# Patient Record
Sex: FEMALE | Race: WHITE | NOT HISPANIC OR LATINO
[De-identification: names, ages, dates, MRNs, and addresses within clinical notes are randomized per-mention and may not be internally consistent; named-entity substitution may affect disease eponyms.]

---

## 2017-06-21 ENCOUNTER — APPOINTMENT (OUTPATIENT)
Dept: FAMILY MEDICINE | Facility: CLINIC | Age: 47
End: 2017-06-21

## 2017-06-21 VITALS
HEIGHT: 61 IN | WEIGHT: 168 LBS | DIASTOLIC BLOOD PRESSURE: 70 MMHG | BODY MASS INDEX: 31.72 KG/M2 | SYSTOLIC BLOOD PRESSURE: 120 MMHG

## 2017-06-21 DIAGNOSIS — Z87.09 PERSONAL HISTORY OF OTHER DISEASES OF THE RESPIRATORY SYSTEM: ICD-10-CM

## 2017-06-21 DIAGNOSIS — R05 COUGH: ICD-10-CM

## 2017-06-21 DIAGNOSIS — J45.909 UNSPECIFIED ASTHMA, UNCOMPLICATED: ICD-10-CM

## 2017-06-21 DIAGNOSIS — Z86.69 PERSONAL HISTORY OF OTHER DISEASES OF THE NERVOUS SYSTEM AND SENSE ORGANS: ICD-10-CM

## 2017-06-21 RX ORDER — ANASTROZOLE TABLETS 1 MG/1
1 TABLET ORAL
Qty: 30 | Refills: 0 | Status: DISCONTINUED | COMMUNITY
Start: 2017-03-15

## 2017-06-21 RX ORDER — OXYCODONE AND ACETAMINOPHEN 5; 325 MG/1; MG/1
5-325 TABLET ORAL
Qty: 20 | Refills: 0 | Status: DISCONTINUED | COMMUNITY
Start: 2017-05-30

## 2017-06-21 RX ORDER — NALTREXONE HYDROCHLORIDE AND BUPROPION HYDROCHLORIDE 8; 90 MG/1; MG/1
8-90 TABLET, EXTENDED RELEASE ORAL
Qty: 120 | Refills: 0 | Status: DISCONTINUED | COMMUNITY
Start: 2016-12-07

## 2017-06-21 RX ORDER — CHLORHEXIDINE GLUCONATE, 0.12% ORAL RINSE 1.2 MG/ML
0.12 SOLUTION DENTAL
Qty: 473 | Refills: 0 | Status: DISCONTINUED | COMMUNITY
Start: 2017-05-30

## 2017-06-21 RX ORDER — DOXYCYCLINE 100 MG/1
100 CAPSULE ORAL
Qty: 14 | Refills: 0 | Status: DISCONTINUED | COMMUNITY
Start: 2017-06-11

## 2017-06-21 RX ORDER — AMOXICILLIN 500 MG/1
500 TABLET, FILM COATED ORAL
Qty: 21 | Refills: 0 | Status: DISCONTINUED | COMMUNITY
Start: 2017-05-30

## 2017-07-05 ENCOUNTER — MEDICATION RENEWAL (OUTPATIENT)
Age: 47
End: 2017-07-05

## 2017-07-18 ENCOUNTER — RX RENEWAL (OUTPATIENT)
Age: 47
End: 2017-07-18

## 2017-07-25 ENCOUNTER — APPOINTMENT (OUTPATIENT)
Dept: FAMILY MEDICINE | Facility: CLINIC | Age: 47
End: 2017-07-25

## 2017-07-25 VITALS
BODY MASS INDEX: 31.72 KG/M2 | SYSTOLIC BLOOD PRESSURE: 120 MMHG | HEIGHT: 61 IN | DIASTOLIC BLOOD PRESSURE: 80 MMHG | WEIGHT: 168 LBS

## 2017-07-25 DIAGNOSIS — Z85.3 PERSONAL HISTORY OF MALIGNANT NEOPLASM OF BREAST: ICD-10-CM

## 2017-07-26 LAB
25(OH)D3 SERPL-MCNC: 36.8 NG/ML
ALBUMIN SERPL ELPH-MCNC: 4.4 G/DL
ALP BLD-CCNC: 78 U/L
ALT SERPL-CCNC: 16 U/L
ANION GAP SERPL CALC-SCNC: 18 MMOL/L
APPEARANCE: ABNORMAL
AST SERPL-CCNC: 17 U/L
BACTERIA: ABNORMAL
BASOPHILS # BLD AUTO: 0.02 K/UL
BASOPHILS NFR BLD AUTO: 0.4 %
BILIRUB SERPL-MCNC: 0.3 MG/DL
BILIRUBIN URINE: NEGATIVE
BLOOD URINE: NEGATIVE
BUN SERPL-MCNC: 12 MG/DL
CALCIUM SERPL-MCNC: 9.1 MG/DL
CHLORIDE SERPL-SCNC: 103 MMOL/L
CHOLEST SERPL-MCNC: 162 MG/DL
CHOLEST/HDLC SERPL: 2.5 RATIO
CO2 SERPL-SCNC: 21 MMOL/L
COLOR: YELLOW
CREAT SERPL-MCNC: 0.81 MG/DL
EOSINOPHIL # BLD AUTO: 0.09 K/UL
EOSINOPHIL NFR BLD AUTO: 1.9 %
GLUCOSE QUALITATIVE U: NORMAL MG/DL
GLUCOSE SERPL-MCNC: 116 MG/DL
HBA1C MFR BLD HPLC: 5.6 %
HCT VFR BLD CALC: 33.7 %
HDLC SERPL-MCNC: 64 MG/DL
HGB BLD-MCNC: 10.5 G/DL
HYALINE CASTS: 12 /LPF
IMM GRANULOCYTES NFR BLD AUTO: 0 %
IRON SERPL-MCNC: 37 UG/DL
KETONES URINE: NEGATIVE
LDLC SERPL CALC-MCNC: 72 MG/DL
LEUKOCYTE ESTERASE URINE: NEGATIVE
LYMPHOCYTES # BLD AUTO: 1.46 K/UL
LYMPHOCYTES NFR BLD AUTO: 31 %
MAN DIFF?: NORMAL
MCHC RBC-ENTMCNC: 24.7 PG
MCHC RBC-ENTMCNC: 31.2 GM/DL
MCV RBC AUTO: 79.3 FL
MICROSCOPIC-UA: NORMAL
MONOCYTES # BLD AUTO: 0.22 K/UL
MONOCYTES NFR BLD AUTO: 4.7 %
NEUTROPHILS # BLD AUTO: 2.92 K/UL
NEUTROPHILS NFR BLD AUTO: 62 %
NITRITE URINE: POSITIVE
PH URINE: 6
PLATELET # BLD AUTO: 269 K/UL
POTASSIUM SERPL-SCNC: 4.3 MMOL/L
PROT SERPL-MCNC: 6.6 G/DL
PROTEIN URINE: NEGATIVE MG/DL
RBC # BLD: 4.25 M/UL
RBC # FLD: 16.5 %
RED BLOOD CELLS URINE: 3 /HPF
SODIUM SERPL-SCNC: 142 MMOL/L
SPECIFIC GRAVITY URINE: 1.02
SQUAMOUS EPITHELIAL CELLS: 7 /HPF
TRIGL SERPL-MCNC: 132 MG/DL
TSH SERPL-ACNC: 3.89 UIU/ML
UROBILINOGEN URINE: NORMAL MG/DL
WBC # FLD AUTO: 4.71 K/UL
WHITE BLOOD CELLS URINE: 3 /HPF

## 2017-07-31 ENCOUNTER — RX RENEWAL (OUTPATIENT)
Age: 47
End: 2017-07-31

## 2017-08-16 ENCOUNTER — MEDICATION RENEWAL (OUTPATIENT)
Age: 47
End: 2017-08-16

## 2017-09-05 ENCOUNTER — MEDICATION RENEWAL (OUTPATIENT)
Age: 47
End: 2017-09-05

## 2017-12-04 ENCOUNTER — MEDICATION RENEWAL (OUTPATIENT)
Age: 47
End: 2017-12-04

## 2017-12-28 ENCOUNTER — APPOINTMENT (OUTPATIENT)
Dept: FAMILY MEDICINE | Facility: CLINIC | Age: 47
End: 2017-12-28
Payer: COMMERCIAL

## 2017-12-28 VITALS
SYSTOLIC BLOOD PRESSURE: 110 MMHG | BODY MASS INDEX: 32.85 KG/M2 | HEIGHT: 61 IN | DIASTOLIC BLOOD PRESSURE: 80 MMHG | WEIGHT: 174 LBS

## 2017-12-28 LAB — CYTOLOGY CVX/VAG DOC THIN PREP: NORMAL

## 2017-12-28 PROCEDURE — 99213 OFFICE O/P EST LOW 20 MIN: CPT

## 2017-12-28 RX ORDER — ETODOLAC 400 MG/1
400 TABLET, FILM COATED ORAL TWICE DAILY
Qty: 28 | Refills: 0 | Status: DISCONTINUED | COMMUNITY
Start: 2017-06-21 | End: 2017-12-28

## 2017-12-28 RX ORDER — PRAMIPEXOLE DIHYDROCHLORIDE 0.5 MG/1
0.5 TABLET ORAL
Qty: 30 | Refills: 1 | Status: DISCONTINUED | COMMUNITY
Start: 2017-07-05 | End: 2017-12-28

## 2017-12-28 RX ORDER — BACLOFEN 10 MG/1
10 TABLET ORAL
Qty: 30 | Refills: 0 | Status: DISCONTINUED | COMMUNITY
Start: 2017-06-21 | End: 2017-12-28

## 2017-12-28 RX ORDER — HYDROCODONE BITARTRATE AND ACETAMINOPHEN 7.5; 3 MG/1; MG/1
7.5-3 TABLET ORAL
Qty: 12 | Refills: 0 | Status: COMPLETED | COMMUNITY
Start: 2017-08-23

## 2017-12-28 RX ORDER — PRAMIPEXOLE DIHYDROCHLORIDE 0.12 MG/1
0.12 TABLET ORAL
Qty: 50 | Refills: 0 | Status: COMPLETED | COMMUNITY
Start: 2017-08-19

## 2018-04-09 ENCOUNTER — APPOINTMENT (OUTPATIENT)
Dept: FAMILY MEDICINE | Facility: CLINIC | Age: 48
End: 2018-04-09
Payer: COMMERCIAL

## 2018-04-09 VITALS
BODY MASS INDEX: 33.04 KG/M2 | TEMPERATURE: 97.8 F | WEIGHT: 175 LBS | SYSTOLIC BLOOD PRESSURE: 110 MMHG | HEIGHT: 61 IN | DIASTOLIC BLOOD PRESSURE: 80 MMHG

## 2018-04-09 PROCEDURE — 36415 COLL VENOUS BLD VENIPUNCTURE: CPT

## 2018-04-09 PROCEDURE — 99213 OFFICE O/P EST LOW 20 MIN: CPT | Mod: 25

## 2018-05-28 ENCOUNTER — RX RENEWAL (OUTPATIENT)
Age: 48
End: 2018-05-28

## 2018-06-05 ENCOUNTER — RX RENEWAL (OUTPATIENT)
Age: 48
End: 2018-06-05

## 2018-08-01 ENCOUNTER — APPOINTMENT (OUTPATIENT)
Dept: FAMILY MEDICINE | Facility: CLINIC | Age: 48
End: 2018-08-01
Payer: COMMERCIAL

## 2018-08-01 VITALS
RESPIRATION RATE: 16 BRPM | OXYGEN SATURATION: 97 % | DIASTOLIC BLOOD PRESSURE: 64 MMHG | WEIGHT: 178 LBS | BODY MASS INDEX: 33.61 KG/M2 | HEIGHT: 61 IN | HEART RATE: 76 BPM | SYSTOLIC BLOOD PRESSURE: 102 MMHG

## 2018-08-01 PROCEDURE — 36415 COLL VENOUS BLD VENIPUNCTURE: CPT

## 2018-08-01 PROCEDURE — 99214 OFFICE O/P EST MOD 30 MIN: CPT | Mod: 25

## 2018-08-01 RX ORDER — AMOXICILLIN AND CLAVULANATE POTASSIUM 875; 125 MG/1; MG/1
875-125 TABLET, COATED ORAL
Qty: 14 | Refills: 0 | Status: DISCONTINUED | COMMUNITY
Start: 2018-04-09 | End: 2018-08-01

## 2018-08-01 RX ORDER — SODIUM SULFATE, POTASSIUM SULFATE, MAGNESIUM SULFATE 17.5; 3.13; 1.6 G/ML; G/ML; G/ML
17.5-3.13-1.6 SOLUTION, CONCENTRATE ORAL
Qty: 354 | Refills: 0 | Status: DISCONTINUED | COMMUNITY
Start: 2018-01-26 | End: 2018-08-01

## 2018-08-01 RX ORDER — PREDNISONE 20 MG/1
20 TABLET ORAL
Qty: 12 | Refills: 0 | Status: DISCONTINUED | COMMUNITY
Start: 2018-04-09 | End: 2018-08-01

## 2018-08-01 RX ORDER — HYDROCODONE BITARTRATE AND HOMATROPINE METHYLBROMIDE 5; 1.5 MG/5ML; MG/5ML
5-1.5 SOLUTION ORAL
Qty: 120 | Refills: 0 | Status: DISCONTINUED | COMMUNITY
Start: 2017-12-28 | End: 2018-08-01

## 2018-08-01 NOTE — PHYSICAL EXAM
[Well Nourished] : well nourished [Clear to Auscultation] : lungs were clear to auscultation bilaterally [Regular Rhythm] : with a regular rhythm [de-identified] : tenderness to costal cartilage on right

## 2018-08-01 NOTE — HEALTH RISK ASSESSMENT
[] : No [No falls in past year] : Patient reported no falls in the past year [0] : 2) Feeling down, depressed, or hopeless: Not at all (0) [[id number]] : 0

## 2018-08-01 NOTE — HISTORY OF PRESENT ILLNESS
[de-identified] : 48 year old female here with complaints of left rib pain after a history of breaking three of her ribs. Patient feels she has not healed right. Patient also takes contrave for restless leg which is working well. Patients active medications, allergies and issues were all reviewed with the patient at time of visit.\par

## 2018-08-02 LAB
ALBUMIN SERPL ELPH-MCNC: 4.6 G/DL
ALP BLD-CCNC: 100 U/L
ALT SERPL-CCNC: 39 U/L
ANION GAP SERPL CALC-SCNC: 13 MMOL/L
AST SERPL-CCNC: 29 U/L
BASOPHILS # BLD AUTO: 0.02 K/UL
BASOPHILS NFR BLD AUTO: 0.4 %
BILIRUB SERPL-MCNC: 0.2 MG/DL
BUN SERPL-MCNC: 10 MG/DL
CALCIUM SERPL-MCNC: 9.5 MG/DL
CHLORIDE SERPL-SCNC: 103 MMOL/L
CHOLEST SERPL-MCNC: 181 MG/DL
CHOLEST/HDLC SERPL: 3.2 RATIO
CO2 SERPL-SCNC: 26 MMOL/L
CREAT SERPL-MCNC: 0.85 MG/DL
EOSINOPHIL # BLD AUTO: 0.13 K/UL
EOSINOPHIL NFR BLD AUTO: 2.7 %
FOLATE SERPL-MCNC: 14.6 NG/ML
GLUCOSE SERPL-MCNC: 107 MG/DL
HBA1C MFR BLD HPLC: 5.9 %
HCT VFR BLD CALC: 37.4 %
HDLC SERPL-MCNC: 56 MG/DL
HGB BLD-MCNC: 11.3 G/DL
IMM GRANULOCYTES NFR BLD AUTO: 0.4 %
LDLC SERPL CALC-MCNC: 95 MG/DL
LYMPHOCYTES # BLD AUTO: 1.38 K/UL
LYMPHOCYTES NFR BLD AUTO: 29 %
MAN DIFF?: NORMAL
MCHC RBC-ENTMCNC: 24 PG
MCHC RBC-ENTMCNC: 30.2 GM/DL
MCV RBC AUTO: 79.4 FL
MONOCYTES # BLD AUTO: 0.3 K/UL
MONOCYTES NFR BLD AUTO: 6.3 %
NEUTROPHILS # BLD AUTO: 2.91 K/UL
NEUTROPHILS NFR BLD AUTO: 61.2 %
PLATELET # BLD AUTO: 260 K/UL
POTASSIUM SERPL-SCNC: 4.7 MMOL/L
PROT SERPL-MCNC: 7.1 G/DL
RBC # BLD: 4.71 M/UL
RBC # FLD: 16.2 %
SODIUM SERPL-SCNC: 142 MMOL/L
TRIGL SERPL-MCNC: 149 MG/DL
TSH SERPL-ACNC: 3.32 UIU/ML
VIT B12 SERPL-MCNC: 873 PG/ML
WBC # FLD AUTO: 4.76 K/UL

## 2018-09-26 ENCOUNTER — MEDICATION RENEWAL (OUTPATIENT)
Age: 48
End: 2018-09-26

## 2018-10-16 ENCOUNTER — MEDICATION RENEWAL (OUTPATIENT)
Age: 48
End: 2018-10-16

## 2018-10-23 ENCOUNTER — MEDICATION RENEWAL (OUTPATIENT)
Age: 48
End: 2018-10-23

## 2019-02-14 ENCOUNTER — APPOINTMENT (OUTPATIENT)
Dept: FAMILY MEDICINE | Facility: CLINIC | Age: 49
End: 2019-02-14
Payer: COMMERCIAL

## 2019-02-14 VITALS
HEART RATE: 76 BPM | BODY MASS INDEX: 32.2 KG/M2 | TEMPERATURE: 98.5 F | HEIGHT: 62 IN | DIASTOLIC BLOOD PRESSURE: 80 MMHG | WEIGHT: 175 LBS | SYSTOLIC BLOOD PRESSURE: 100 MMHG

## 2019-02-14 DIAGNOSIS — M94.0 CHONDROCOSTAL JUNCTION SYNDROME [TIETZE]: ICD-10-CM

## 2019-02-14 DIAGNOSIS — R07.81 PLEURODYNIA: ICD-10-CM

## 2019-02-14 DIAGNOSIS — G25.81 RESTLESS LEGS SYNDROME: ICD-10-CM

## 2019-02-14 DIAGNOSIS — Z87.39 PERSONAL HISTORY OF OTHER DISEASES OF THE MUSCULOSKELETAL SYSTEM AND CONNECTIVE TISSUE: ICD-10-CM

## 2019-02-14 DIAGNOSIS — J30.9 ALLERGIC RHINITIS, UNSPECIFIED: ICD-10-CM

## 2019-02-14 PROCEDURE — 99214 OFFICE O/P EST MOD 30 MIN: CPT

## 2019-02-14 NOTE — PHYSICAL EXAM
[No Acute Distress] : no acute distress [Well Nourished] : well nourished [Normal Sclera/Conjunctiva] : normal sclera/conjunctiva [EOMI] : extraocular movements intact [Normal Outer Ear/Nose] : the outer ears and nose were normal in appearance [No JVD] : no jugular venous distention [No Respiratory Distress] : no respiratory distress  [Clear to Auscultation] : lungs were clear to auscultation bilaterally [No Accessory Muscle Use] : no accessory muscle use [Normal Rate] : normal rate  [Regular Rhythm] : with a regular rhythm [Normal Gait] : normal gait [Normal Affect] : the affect was normal [Alert and Oriented x3] : oriented to person, place, and time [Normal Insight/Judgement] : insight and judgment were intact

## 2019-03-08 ENCOUNTER — RX RENEWAL (OUTPATIENT)
Age: 49
End: 2019-03-08

## 2019-04-03 ENCOUNTER — RX RENEWAL (OUTPATIENT)
Age: 49
End: 2019-04-03

## 2020-11-19 ENCOUNTER — APPOINTMENT (OUTPATIENT)
Dept: FAMILY MEDICINE | Facility: CLINIC | Age: 50
End: 2020-11-19
Payer: COMMERCIAL

## 2020-11-19 VITALS
DIASTOLIC BLOOD PRESSURE: 80 MMHG | WEIGHT: 183 LBS | SYSTOLIC BLOOD PRESSURE: 114 MMHG | OXYGEN SATURATION: 97 % | HEART RATE: 78 BPM | BODY MASS INDEX: 33.68 KG/M2 | RESPIRATION RATE: 16 BRPM | HEIGHT: 62 IN

## 2020-11-19 DIAGNOSIS — Z11.59 ENCOUNTER FOR SCREENING FOR OTHER VIRAL DISEASES: ICD-10-CM

## 2020-11-19 DIAGNOSIS — Z00.00 ENCOUNTER FOR GENERAL ADULT MEDICAL EXAMINATION W/OUT ABNORMAL FINDINGS: ICD-10-CM

## 2020-11-19 DIAGNOSIS — K76.0 FATTY (CHANGE OF) LIVER, NOT ELSEWHERE CLASSIFIED: ICD-10-CM

## 2020-11-19 LAB
25(OH)D3 SERPL-MCNC: 34.5 NG/ML
ALBUMIN SERPL ELPH-MCNC: 4.6 G/DL
ALP BLD-CCNC: 90 U/L
ALT SERPL-CCNC: 59 U/L
ANION GAP SERPL CALC-SCNC: 12 MMOL/L
APPEARANCE: CLEAR
AST SERPL-CCNC: 43 U/L
BACTERIA: NEGATIVE
BASOPHILS # BLD AUTO: 0.03 K/UL
BASOPHILS NFR BLD AUTO: 0.6 %
BILIRUB SERPL-MCNC: 0.3 MG/DL
BILIRUBIN URINE: NEGATIVE
BLOOD URINE: NEGATIVE
BUN SERPL-MCNC: 9 MG/DL
CALCIUM SERPL-MCNC: 9.7 MG/DL
CHLORIDE SERPL-SCNC: 103 MMOL/L
CHOLEST SERPL-MCNC: 175 MG/DL
CO2 SERPL-SCNC: 23 MMOL/L
COLOR: YELLOW
CREAT SERPL-MCNC: 0.74 MG/DL
EOSINOPHIL # BLD AUTO: 0.11 K/UL
EOSINOPHIL NFR BLD AUTO: 2 %
ESTIMATED AVERAGE GLUCOSE: 134 MG/DL
GLUCOSE QUALITATIVE U: NEGATIVE
GLUCOSE SERPL-MCNC: 107 MG/DL
HBA1C MFR BLD HPLC: 6.3 %
HCT VFR BLD CALC: 36.3 %
HDLC SERPL-MCNC: 53 MG/DL
HGB BLD-MCNC: 11.3 G/DL
HYALINE CASTS: 0 /LPF
IMM GRANULOCYTES NFR BLD AUTO: 0.2 %
KETONES URINE: NEGATIVE
LDLC SERPL CALC-MCNC: 94 MG/DL
LEUKOCYTE ESTERASE URINE: NEGATIVE
LYMPHOCYTES # BLD AUTO: 1.73 K/UL
LYMPHOCYTES NFR BLD AUTO: 32 %
MAN DIFF?: NORMAL
MCHC RBC-ENTMCNC: 25 PG
MCHC RBC-ENTMCNC: 31.1 GM/DL
MCV RBC AUTO: 80.3 FL
MICROSCOPIC-UA: NORMAL
MONOCYTES # BLD AUTO: 0.34 K/UL
MONOCYTES NFR BLD AUTO: 6.3 %
NEUTROPHILS # BLD AUTO: 3.19 K/UL
NEUTROPHILS NFR BLD AUTO: 58.9 %
NITRITE URINE: NEGATIVE
NONHDLC SERPL-MCNC: 122 MG/DL
PH URINE: 6.5
PLATELET # BLD AUTO: 264 K/UL
POTASSIUM SERPL-SCNC: 4.3 MMOL/L
PROT SERPL-MCNC: 7.2 G/DL
PROTEIN URINE: NEGATIVE
RBC # BLD: 4.52 M/UL
RBC # FLD: 15.9 %
RED BLOOD CELLS URINE: 1 /HPF
SODIUM SERPL-SCNC: 138 MMOL/L
SPECIFIC GRAVITY URINE: 1.02
SQUAMOUS EPITHELIAL CELLS: 4 /HPF
TRIGL SERPL-MCNC: 139 MG/DL
TSH SERPL-ACNC: 2.64 UIU/ML
UROBILINOGEN URINE: NORMAL
WBC # FLD AUTO: 5.41 K/UL
WHITE BLOOD CELLS URINE: 7 /HPF

## 2020-11-19 PROCEDURE — 99396 PREV VISIT EST AGE 40-64: CPT | Mod: 25

## 2020-11-19 PROCEDURE — 36415 COLL VENOUS BLD VENIPUNCTURE: CPT

## 2020-11-19 RX ORDER — NALTREXONE HYDROCHLORIDE AND BUPROPION HYDROCHLORIDE 8; 90 MG/1; MG/1
8-90 TABLET, EXTENDED RELEASE ORAL
Qty: 100 | Refills: 0 | Status: DISCONTINUED | COMMUNITY
Start: 2017-10-19 | End: 2020-11-19

## 2020-11-19 RX ORDER — FLUTICASONE PROPIONATE 110 UG/1
110 AEROSOL, METERED RESPIRATORY (INHALATION) TWICE DAILY
Qty: 1 | Refills: 0 | Status: DISCONTINUED | COMMUNITY
Start: 2017-12-28 | End: 2020-11-19

## 2020-11-19 RX ORDER — PREDNISONE 20 MG/1
20 TABLET ORAL
Qty: 10 | Refills: 0 | Status: DISCONTINUED | COMMUNITY
Start: 2019-02-14 | End: 2020-11-19

## 2020-11-19 RX ORDER — NAPROXEN 500 MG/1
500 TABLET ORAL
Qty: 20 | Refills: 0 | Status: DISCONTINUED | COMMUNITY
Start: 2018-08-01 | End: 2020-11-19

## 2020-11-19 RX ORDER — ALBUTEROL SULFATE 90 UG/1
108 (90 BASE) INHALANT RESPIRATORY (INHALATION)
Qty: 1 | Refills: 0 | Status: DISCONTINUED | COMMUNITY
Start: 2019-03-08 | End: 2020-11-19

## 2020-11-19 RX ORDER — PROMETHAZINE HYDROCHLORIDE AND CODEINE PHOSPHATE 6.25; 1 MG/5ML; MG/5ML
6.25-1 SOLUTION ORAL
Qty: 1 | Refills: 0 | Status: DISCONTINUED | COMMUNITY
Start: 2019-02-14 | End: 2020-11-19

## 2020-11-19 RX ORDER — ALBUTEROL SULFATE 90 UG/1
108 (90 BASE) AEROSOL, METERED RESPIRATORY (INHALATION)
Qty: 1 | Refills: 0 | Status: DISCONTINUED | COMMUNITY
Start: 2019-02-14 | End: 2020-11-19

## 2020-11-20 LAB
SARS-COV-2 IGG SERPL IA-ACNC: <0.1 INDEX
SARS-COV-2 IGG SERPL QL IA: NEGATIVE

## 2021-08-02 ENCOUNTER — APPOINTMENT (OUTPATIENT)
Dept: FAMILY MEDICINE | Facility: CLINIC | Age: 51
End: 2021-08-02
Payer: COMMERCIAL

## 2021-08-02 VITALS
TEMPERATURE: 98 F | SYSTOLIC BLOOD PRESSURE: 120 MMHG | WEIGHT: 183 LBS | HEIGHT: 62 IN | HEART RATE: 77 BPM | DIASTOLIC BLOOD PRESSURE: 70 MMHG | BODY MASS INDEX: 33.68 KG/M2 | OXYGEN SATURATION: 98 %

## 2021-08-02 DIAGNOSIS — K21.9 GASTRO-ESOPHAGEAL REFLUX DISEASE W/OUT ESOPHAGITIS: ICD-10-CM

## 2021-08-02 PROCEDURE — 99214 OFFICE O/P EST MOD 30 MIN: CPT

## 2021-08-02 RX ORDER — HYDROXYZINE PAMOATE 25 MG/1
25 CAPSULE ORAL
Qty: 30 | Refills: 0 | Status: DISCONTINUED | COMMUNITY
Start: 2020-11-29 | End: 2021-08-02

## 2021-08-02 RX ORDER — CETIRIZINE HYDROCHLORIDE 10 MG/1
10 TABLET, COATED ORAL
Qty: 30 | Refills: 0 | Status: DISCONTINUED | COMMUNITY
Start: 2021-05-27

## 2021-08-02 RX ORDER — TRAZODONE HYDROCHLORIDE 50 MG/1
50 TABLET ORAL
Qty: 30 | Refills: 0 | Status: DISCONTINUED | COMMUNITY
Start: 2020-11-19 | End: 2021-08-02

## 2021-11-09 ENCOUNTER — INPATIENT (INPATIENT)
Facility: HOSPITAL | Age: 51
LOS: 3 days | Discharge: HOME | DRG: 857 | End: 2021-11-13
Attending: PLASTIC SURGERY | Admitting: PLASTIC SURGERY
Payer: COMMERCIAL

## 2021-11-09 VITALS
SYSTOLIC BLOOD PRESSURE: 112 MMHG | RESPIRATION RATE: 18 BRPM | DIASTOLIC BLOOD PRESSURE: 76 MMHG | OXYGEN SATURATION: 100 % | TEMPERATURE: 98 F | HEART RATE: 127 BPM

## 2021-11-09 DIAGNOSIS — N61.1 ABSCESS OF THE BREAST AND NIPPLE: ICD-10-CM

## 2021-11-09 DIAGNOSIS — Z98.89 OTHER SPECIFIED POSTPROCEDURAL STATES: Chronic | ICD-10-CM

## 2021-11-09 DIAGNOSIS — N61.0 MASTITIS WITHOUT ABSCESS: ICD-10-CM

## 2021-11-09 LAB
ALBUMIN SERPL ELPH-MCNC: 3.8 G/DL — SIGNIFICANT CHANGE UP (ref 3.3–5)
ALP SERPL-CCNC: 89 U/L — SIGNIFICANT CHANGE UP (ref 40–120)
ALT FLD-CCNC: 34 U/L — SIGNIFICANT CHANGE UP (ref 12–78)
ANION GAP SERPL CALC-SCNC: 10 MMOL/L — SIGNIFICANT CHANGE UP (ref 5–17)
ANION GAP SERPL CALC-SCNC: 8 MMOL/L — SIGNIFICANT CHANGE UP (ref 5–17)
APPEARANCE UR: CLEAR — SIGNIFICANT CHANGE UP
APTT BLD: 29.3 SEC — SIGNIFICANT CHANGE UP (ref 27.5–35.5)
AST SERPL-CCNC: 17 U/L — SIGNIFICANT CHANGE UP (ref 15–37)
BASOPHILS # BLD AUTO: 0.05 K/UL — SIGNIFICANT CHANGE UP (ref 0–0.2)
BASOPHILS NFR BLD AUTO: 0.3 % — SIGNIFICANT CHANGE UP (ref 0–2)
BILIRUB SERPL-MCNC: 0.6 MG/DL — SIGNIFICANT CHANGE UP (ref 0.2–1.2)
BILIRUB UR-MCNC: NEGATIVE — SIGNIFICANT CHANGE UP
BLD GP AB SCN SERPL QL: SIGNIFICANT CHANGE UP
BUN SERPL-MCNC: 7 MG/DL — SIGNIFICANT CHANGE UP (ref 7–23)
BUN SERPL-MCNC: 9 MG/DL — SIGNIFICANT CHANGE UP (ref 7–23)
CALCIUM SERPL-MCNC: 8.1 MG/DL — LOW (ref 8.5–10.1)
CALCIUM SERPL-MCNC: 9.1 MG/DL — SIGNIFICANT CHANGE UP (ref 8.5–10.1)
CHLORIDE SERPL-SCNC: 105 MMOL/L — SIGNIFICANT CHANGE UP (ref 96–108)
CHLORIDE SERPL-SCNC: 111 MMOL/L — HIGH (ref 96–108)
CO2 SERPL-SCNC: 19 MMOL/L — LOW (ref 22–31)
CO2 SERPL-SCNC: 21 MMOL/L — LOW (ref 22–31)
COLOR SPEC: YELLOW — SIGNIFICANT CHANGE UP
CREAT SERPL-MCNC: 0.78 MG/DL — SIGNIFICANT CHANGE UP (ref 0.5–1.3)
CREAT SERPL-MCNC: 0.95 MG/DL — SIGNIFICANT CHANGE UP (ref 0.5–1.3)
DIFF PNL FLD: NEGATIVE — SIGNIFICANT CHANGE UP
EOSINOPHIL # BLD AUTO: 0.05 K/UL — SIGNIFICANT CHANGE UP (ref 0–0.5)
EOSINOPHIL NFR BLD AUTO: 0.3 % — SIGNIFICANT CHANGE UP (ref 0–6)
GLUCOSE SERPL-MCNC: 154 MG/DL — HIGH (ref 70–99)
GLUCOSE SERPL-MCNC: 173 MG/DL — HIGH (ref 70–99)
GLUCOSE UR QL: NEGATIVE MG/DL — SIGNIFICANT CHANGE UP
HCT VFR BLD CALC: 24.9 % — LOW (ref 34.5–45)
HCT VFR BLD CALC: 26.7 % — LOW (ref 34.5–45)
HGB BLD-MCNC: 7.7 G/DL — LOW (ref 11.5–15.5)
HGB BLD-MCNC: 8.5 G/DL — LOW (ref 11.5–15.5)
IMM GRANULOCYTES NFR BLD AUTO: 0.6 % — SIGNIFICANT CHANGE UP (ref 0–1.5)
INR BLD: 1.17 RATIO — HIGH (ref 0.88–1.16)
KETONES UR-MCNC: NEGATIVE — SIGNIFICANT CHANGE UP
LACTATE SERPL-SCNC: 2.5 MMOL/L — HIGH (ref 0.7–2)
LACTATE SERPL-SCNC: 3.2 MMOL/L — HIGH (ref 0.7–2)
LEUKOCYTE ESTERASE UR-ACNC: ABNORMAL
LYMPHOCYTES # BLD AUTO: 1.29 K/UL — SIGNIFICANT CHANGE UP (ref 1–3.3)
LYMPHOCYTES # BLD AUTO: 7.5 % — LOW (ref 13–44)
MCHC RBC-ENTMCNC: 26.2 PG — LOW (ref 27–34)
MCHC RBC-ENTMCNC: 26.8 PG — LOW (ref 27–34)
MCHC RBC-ENTMCNC: 30.9 GM/DL — LOW (ref 32–36)
MCHC RBC-ENTMCNC: 31.8 GM/DL — LOW (ref 32–36)
MCV RBC AUTO: 84.2 FL — SIGNIFICANT CHANGE UP (ref 80–100)
MCV RBC AUTO: 84.7 FL — SIGNIFICANT CHANGE UP (ref 80–100)
MONOCYTES # BLD AUTO: 0.82 K/UL — SIGNIFICANT CHANGE UP (ref 0–0.9)
MONOCYTES NFR BLD AUTO: 4.8 % — SIGNIFICANT CHANGE UP (ref 2–14)
NEUTROPHILS # BLD AUTO: 14.87 K/UL — HIGH (ref 1.8–7.4)
NEUTROPHILS NFR BLD AUTO: 86.5 % — HIGH (ref 43–77)
NITRITE UR-MCNC: NEGATIVE — SIGNIFICANT CHANGE UP
PH UR: 6.5 — SIGNIFICANT CHANGE UP (ref 5–8)
PLATELET # BLD AUTO: 290 K/UL — SIGNIFICANT CHANGE UP (ref 150–400)
PLATELET # BLD AUTO: 339 K/UL — SIGNIFICANT CHANGE UP (ref 150–400)
POTASSIUM SERPL-MCNC: 3.4 MMOL/L — LOW (ref 3.5–5.3)
POTASSIUM SERPL-MCNC: 3.5 MMOL/L — SIGNIFICANT CHANGE UP (ref 3.5–5.3)
POTASSIUM SERPL-SCNC: 3.4 MMOL/L — LOW (ref 3.5–5.3)
POTASSIUM SERPL-SCNC: 3.5 MMOL/L — SIGNIFICANT CHANGE UP (ref 3.5–5.3)
PROT SERPL-MCNC: 7.4 GM/DL — SIGNIFICANT CHANGE UP (ref 6–8.3)
PROT UR-MCNC: NEGATIVE MG/DL — SIGNIFICANT CHANGE UP
PROTHROM AB SERPL-ACNC: 13.5 SEC — SIGNIFICANT CHANGE UP (ref 10.6–13.6)
RBC # BLD: 2.94 M/UL — LOW (ref 3.8–5.2)
RBC # BLD: 3.17 M/UL — LOW (ref 3.8–5.2)
RBC # FLD: 17.2 % — HIGH (ref 10.3–14.5)
RBC # FLD: 17.5 % — HIGH (ref 10.3–14.5)
SARS-COV-2 RNA SPEC QL NAA+PROBE: SIGNIFICANT CHANGE UP
SODIUM SERPL-SCNC: 136 MMOL/L — SIGNIFICANT CHANGE UP (ref 135–145)
SODIUM SERPL-SCNC: 138 MMOL/L — SIGNIFICANT CHANGE UP (ref 135–145)
SP GR SPEC: 1.01 — SIGNIFICANT CHANGE UP (ref 1.01–1.02)
UROBILINOGEN FLD QL: NEGATIVE MG/DL — SIGNIFICANT CHANGE UP
WBC # BLD: 13.34 K/UL — HIGH (ref 3.8–10.5)
WBC # BLD: 17.19 K/UL — HIGH (ref 3.8–10.5)
WBC # FLD AUTO: 13.34 K/UL — HIGH (ref 3.8–10.5)
WBC # FLD AUTO: 17.19 K/UL — HIGH (ref 3.8–10.5)

## 2021-11-09 PROCEDURE — 87075 CULTR BACTERIA EXCEPT BLOOD: CPT

## 2021-11-09 PROCEDURE — 36415 COLL VENOUS BLD VENIPUNCTURE: CPT

## 2021-11-09 PROCEDURE — 86900 BLOOD TYPING SEROLOGIC ABO: CPT

## 2021-11-09 PROCEDURE — 83605 ASSAY OF LACTIC ACID: CPT

## 2021-11-09 PROCEDURE — 85027 COMPLETE CBC AUTOMATED: CPT

## 2021-11-09 PROCEDURE — 86850 RBC ANTIBODY SCREEN: CPT

## 2021-11-09 PROCEDURE — 87102 FUNGUS ISOLATION CULTURE: CPT

## 2021-11-09 PROCEDURE — 80202 ASSAY OF VANCOMYCIN: CPT

## 2021-11-09 PROCEDURE — 71045 X-RAY EXAM CHEST 1 VIEW: CPT | Mod: 26

## 2021-11-09 PROCEDURE — 87070 CULTURE OTHR SPECIMN AEROBIC: CPT

## 2021-11-09 PROCEDURE — 87186 SC STD MICRODIL/AGAR DIL: CPT

## 2021-11-09 PROCEDURE — 93010 ELECTROCARDIOGRAM REPORT: CPT

## 2021-11-09 PROCEDURE — 80048 BASIC METABOLIC PNL TOTAL CA: CPT

## 2021-11-09 PROCEDURE — 86901 BLOOD TYPING SEROLOGIC RH(D): CPT

## 2021-11-09 PROCEDURE — 99285 EMERGENCY DEPT VISIT HI MDM: CPT

## 2021-11-09 PROCEDURE — C9399: CPT

## 2021-11-09 PROCEDURE — 99221 1ST HOSP IP/OBS SF/LOW 40: CPT

## 2021-11-09 PROCEDURE — 86769 SARS-COV-2 COVID-19 ANTIBODY: CPT

## 2021-11-09 PROCEDURE — 87077 CULTURE AEROBIC IDENTIFY: CPT

## 2021-11-09 RX ORDER — PIPERACILLIN AND TAZOBACTAM 4; .5 G/20ML; G/20ML
3.38 INJECTION, POWDER, LYOPHILIZED, FOR SOLUTION INTRAVENOUS ONCE
Refills: 0 | Status: COMPLETED | OUTPATIENT
Start: 2021-11-09 | End: 2021-11-09

## 2021-11-09 RX ORDER — ONDANSETRON 8 MG/1
4 TABLET, FILM COATED ORAL EVERY 6 HOURS
Refills: 0 | Status: DISCONTINUED | OUTPATIENT
Start: 2021-11-09 | End: 2021-11-13

## 2021-11-09 RX ORDER — SODIUM CHLORIDE 9 MG/ML
2200 INJECTION INTRAMUSCULAR; INTRAVENOUS; SUBCUTANEOUS ONCE
Refills: 0 | Status: COMPLETED | OUTPATIENT
Start: 2021-11-09 | End: 2021-11-09

## 2021-11-09 RX ORDER — ENOXAPARIN SODIUM 100 MG/ML
40 INJECTION SUBCUTANEOUS DAILY
Refills: 0 | Status: DISCONTINUED | OUTPATIENT
Start: 2021-11-09 | End: 2021-11-13

## 2021-11-09 RX ORDER — TAMOXIFEN CITRATE 20 MG/1
20 TABLET, FILM COATED ORAL DAILY
Refills: 0 | Status: DISCONTINUED | OUTPATIENT
Start: 2021-11-09 | End: 2021-11-13

## 2021-11-09 RX ORDER — SODIUM CHLORIDE 9 MG/ML
1000 INJECTION INTRAMUSCULAR; INTRAVENOUS; SUBCUTANEOUS
Refills: 0 | Status: DISCONTINUED | OUTPATIENT
Start: 2021-11-09 | End: 2021-11-11

## 2021-11-09 RX ORDER — ACETAMINOPHEN 500 MG
650 TABLET ORAL EVERY 6 HOURS
Refills: 0 | Status: DISCONTINUED | OUTPATIENT
Start: 2021-11-09 | End: 2021-11-13

## 2021-11-09 RX ORDER — VANCOMYCIN HCL 1 G
1000 VIAL (EA) INTRAVENOUS EVERY 12 HOURS
Refills: 0 | Status: DISCONTINUED | OUTPATIENT
Start: 2021-11-09 | End: 2021-11-13

## 2021-11-09 RX ORDER — OXYCODONE HYDROCHLORIDE 5 MG/1
10 TABLET ORAL EVERY 4 HOURS
Refills: 0 | Status: DISCONTINUED | OUTPATIENT
Start: 2021-11-09 | End: 2021-11-13

## 2021-11-09 RX ORDER — PANTOPRAZOLE SODIUM 20 MG/1
40 TABLET, DELAYED RELEASE ORAL DAILY
Refills: 0 | Status: DISCONTINUED | OUTPATIENT
Start: 2021-11-09 | End: 2021-11-13

## 2021-11-09 RX ORDER — HYDROMORPHONE HYDROCHLORIDE 2 MG/ML
0.5 INJECTION INTRAMUSCULAR; INTRAVENOUS; SUBCUTANEOUS EVERY 4 HOURS
Refills: 0 | Status: DISCONTINUED | OUTPATIENT
Start: 2021-11-09 | End: 2021-11-13

## 2021-11-09 RX ORDER — POTASSIUM CHLORIDE 20 MEQ
40 PACKET (EA) ORAL ONCE
Refills: 0 | Status: COMPLETED | OUTPATIENT
Start: 2021-11-09 | End: 2021-11-09

## 2021-11-09 RX ORDER — PIPERACILLIN AND TAZOBACTAM 4; .5 G/20ML; G/20ML
3.38 INJECTION, POWDER, LYOPHILIZED, FOR SOLUTION INTRAVENOUS EVERY 8 HOURS
Refills: 0 | Status: DISCONTINUED | OUTPATIENT
Start: 2021-11-09 | End: 2021-11-13

## 2021-11-09 RX ORDER — VANCOMYCIN HCL 1 G
1000 VIAL (EA) INTRAVENOUS ONCE
Refills: 0 | Status: COMPLETED | OUTPATIENT
Start: 2021-11-09 | End: 2021-11-09

## 2021-11-09 RX ADMIN — PIPERACILLIN AND TAZOBACTAM 3.38 GRAM(S): 4; .5 INJECTION, POWDER, LYOPHILIZED, FOR SOLUTION INTRAVENOUS at 12:44

## 2021-11-09 RX ADMIN — SODIUM CHLORIDE 100 MILLILITER(S): 9 INJECTION INTRAMUSCULAR; INTRAVENOUS; SUBCUTANEOUS at 12:44

## 2021-11-09 RX ADMIN — SODIUM CHLORIDE 100 MILLILITER(S): 9 INJECTION INTRAMUSCULAR; INTRAVENOUS; SUBCUTANEOUS at 23:01

## 2021-11-09 RX ADMIN — PIPERACILLIN AND TAZOBACTAM 200 GRAM(S): 4; .5 INJECTION, POWDER, LYOPHILIZED, FOR SOLUTION INTRAVENOUS at 11:38

## 2021-11-09 RX ADMIN — Medication 250 MILLIGRAM(S): at 21:52

## 2021-11-09 RX ADMIN — HYDROMORPHONE HYDROCHLORIDE 0.5 MILLIGRAM(S): 2 INJECTION INTRAMUSCULAR; INTRAVENOUS; SUBCUTANEOUS at 14:56

## 2021-11-09 RX ADMIN — Medication 40 MILLIEQUIVALENT(S): at 21:53

## 2021-11-09 RX ADMIN — PANTOPRAZOLE SODIUM 40 MILLIGRAM(S): 20 TABLET, DELAYED RELEASE ORAL at 16:10

## 2021-11-09 RX ADMIN — HYDROMORPHONE HYDROCHLORIDE 0.5 MILLIGRAM(S): 2 INJECTION INTRAMUSCULAR; INTRAVENOUS; SUBCUTANEOUS at 21:00

## 2021-11-09 RX ADMIN — SODIUM CHLORIDE 2200 MILLILITER(S): 9 INJECTION INTRAMUSCULAR; INTRAVENOUS; SUBCUTANEOUS at 12:44

## 2021-11-09 RX ADMIN — SODIUM CHLORIDE 2200 MILLILITER(S): 9 INJECTION INTRAMUSCULAR; INTRAVENOUS; SUBCUTANEOUS at 11:22

## 2021-11-09 RX ADMIN — HYDROMORPHONE HYDROCHLORIDE 0.5 MILLIGRAM(S): 2 INJECTION INTRAMUSCULAR; INTRAVENOUS; SUBCUTANEOUS at 20:34

## 2021-11-09 RX ADMIN — PIPERACILLIN AND TAZOBACTAM 25 GRAM(S): 4; .5 INJECTION, POWDER, LYOPHILIZED, FOR SOLUTION INTRAVENOUS at 20:42

## 2021-11-09 RX ADMIN — Medication 250 MILLIGRAM(S): at 12:44

## 2021-11-09 NOTE — ED STATDOCS - SKIN, MLM
skin normal color for race, warm, dry and intact. Ecchymosis left breast and axilla. Surgical incisions areola and lateral breast clean dry and intact. Ecchymosis left breast and axilla. Surgical incisions left areola and lateral breast clean dry and intact.

## 2021-11-09 NOTE — ED ADULT TRIAGE NOTE - CHIEF COMPLAINT QUOTE
Pt sent to ED by breast surgeon for admission for left breast infection. Pt states that she had a left breast reduction on 10/28/2021.

## 2021-11-09 NOTE — H&P ADULT - NSHPPHYSICALEXAM_GEN_ALL_CORE
ICU Vital Signs Last 24 Hrs  T(C): 36.7 (09 Nov 2021 11:05), Max: 36.7 (09 Nov 2021 11:05)  T(F): 98.1 (09 Nov 2021 11:05), Max: 98.1 (09 Nov 2021 11:05)  HR: 106 (09 Nov 2021 13:30) (106 - 127)  BP: 114/77 (09 Nov 2021 13:30) (110/59 - 119/68)  BP(mean): 89 (09 Nov 2021 13:30) (75 - 89)  RR: 19 (09 Nov 2021 13:30) (17 - 23)  SpO2: 100% (09 Nov 2021 13:30) (99% - 100%)            Physical Exam:  GEN: Alert, oriented, resting in bed in NAD  HEENT: NC/AT, PERRL   RESP: LSCTA, B/L Chest exp  CV: RRR, S1S2  ABD: Soft, Flat, NT, ND, +BS  EXT: +2 pulses, no peripheral edema  NEURO: BEAUCHAMP, FC, A&Ox3  PSYCH: appropriate mood and behavior  SKIN: diffuse bruising over chest, Left breast with diffuse TTP, induration, ecchymosis and warmth, incisions clean and dry with no drainage noted

## 2021-11-09 NOTE — ED STATDOCS - NSICDXFAMILYHX_GEN_ALL_CORE_FT
FAMILY HISTORY:  Father  Still living? Yes, Estimated age: 71-80  Family history of diabetes mellitus type II, Age at diagnosis: Age Unknown    Mother  Still living? Yes, Estimated age: 71-80  Family history of breast cancer, Age at diagnosis: Age Unknown    Sibling  Still living? Yes, Estimated age: 41-50  No significant family history, Age at diagnosis: Age Unknown

## 2021-11-09 NOTE — ED STATDOCS - CLINICAL SUMMARY MEDICAL DECISION MAKING FREE TEXT BOX
Pt s/p breast revision here with fevers, pain and bruising. Possible infection. Plan: labs, XR, admit.

## 2021-11-09 NOTE — ED STATDOCS - PROGRESS NOTE DETAILS
Dre, PGY3: spoke with surgeon Dr. Rosales, will likely need to be admitted for washout tomorrow and started on IVF and abx in ED.

## 2021-11-09 NOTE — ED STATDOCS - OBJECTIVE STATEMENT
52 y/o female with a PMHx of breast cancer s/p b/l mastectomy, cholelithiasis, GERD presents to the ED sent by Dr. Rosales. Pt s/p breast revision on 10/28/21. +fever last night, Tmax 101.5F, +nausea. Pt sent to the ED for admission for left breast infection. Fully vaccinated for COVID as of April 2021. No other complaints at this time.

## 2021-11-09 NOTE — H&P ADULT - ASSESSMENT
This is non-toxic appearing 52 y/o female with a PMHx of cholelithiasis, GERD, breast cancer s/p b/l mastectomy with ALLEN 13 years ago with recent breast revision and reduction on 10/28/2021 and drain removal 1 week later who presents to the ED with persistent worsening left breast pain since discharge from her revision, as well as intermittent fevers, chills and nausea. She was found with an elevated WBC of 17 and an elevated lactate of 3.2. She will be admitted for a left breast infection/hematoma with plan for wash out tomorrow in the OR with Dr. Rosales.

## 2021-11-09 NOTE — ED STATDOCS - NSICDXPASTMEDICALHX_GEN_ALL_CORE_FT
PAST MEDICAL HISTORY:  Breast Cancer right breast 2008, treated with chemo and RT    Cholelithiasis     GERD (gastroesophageal reflux disease)

## 2021-11-09 NOTE — ED STATDOCS - NSICDXPASTSURGICALHX_GEN_ALL_CORE_FT
PAST SURGICAL HISTORY:  Breast Cancer mediport in place, mediport removed     Infection breast    S/P Bilateral Mastectomy with tram flap reconstruction 2008    S/P  Section x2    S/P hernia repair incisional 2012    S/P lymph node biopsy multiple right axillary lymph nodes removed 2008    S/P Oophorectomy 2010    S/P Shoulder Surgery rotator cuff 2003

## 2021-11-09 NOTE — H&P ADULT - HISTORY OF PRESENT ILLNESS
This is a 52 y/o female with a PMHx of cholelithiasis, GERD, breast cancer s/p b/l mastectomy with LALEN 13 years ago with recent breast revision and reduction on 10/28/2021 and drain removal 1 week later. She reports persistent worsening left breast pain since discharge from her revision, as well as intermittent fevers, chills and nausea. She denies drainage from the surgical incisions and drain site. She was sent into the ER for evaluation by Dr. Rosales. Denies CP, SOB, ABD pain, cough, dizziness, vomitting

## 2021-11-09 NOTE — H&P ADULT - PROBLEM SELECTOR PLAN 1
Plan:  Admit to surgery under Dr. Rosales - [facility]  Plan for OR tomorrow morning for left breast washout  IVF  ABX - received zosyn and vanco in ED  ID consult placed, ABX per ID  Trend and monitor lactate  regular diet, NPO at MN for procedure   analgesia, antiemetics and antipyretics PRN  AM labs  Gi ppx  Monitor VS, Temp and WBC  Dvt ppx  f/u CXR    Above patient and plan discussed with attending.

## 2021-11-09 NOTE — PATIENT PROFILE ADULT - COMPLETE THE FOLLOWING IF THE PATIENT REFUSES THE INFLUENZA VACCINE:
Risks/benefits discussed with patient/surrogate/Vaccine Information Sheet (VIS) provided-VIS date: 8/6/21
no

## 2021-11-09 NOTE — CONSULT NOTE ADULT - ASSESSMENT
50 y/o female with a PMHx of breast cancer s/p b/l mastectomy, cholelithiasis, GERD presents to the ED sent by Dr. Rosales. Pt s/p breast revision on 10/28/21, had initial b/l mastectomy/breast reconstruction 13 yrs ago. Noted with +fever 11/8 night, Tmax 101.5F, +nausea. Pt sent to the ED for admission for left breast infection. Fully vaccinated for COVID as of April 2021. Here wbc ct 17, eval by surgery, was given IV vancomycin/zosyn.     1. L breast cellulitis. L breast infected hematoma ? abscess ? s/p breast revision surgery  - plan for surgical debridement tomorrow per plastics  - fu blood cultures, OR cultures  - agree with vancomycin [id number] check trough prior to 4th dose  - continue with antibiotic coverage  - tolerating abx well so far; no side effects noted  - reason for abx use and side effects reviewed with patient  - supportive care  - fu cbc    2. other issues - care per medicine 50 y/o female with a PMHx of breast cancer s/p b/l mastectomy, cholelithiasis, GERD presents to the ED sent by Dr. Rosales. Pt s/p breast revision on 10/28/21, had initial b/l mastectomy/breast reconstruction 13 yrs ago. Noted with +fever 11/8 night, Tmax 101.5F, +nausea. Pt sent to the ED for admission for left breast infection. Fully vaccinated for COVID as of April 2021. Here wbc ct 17, eval by surgery, was given IV vancomycin/zosyn.     1. L breast infection. L breast infected hematoma. s/p breast revision surgery  - plan for surgical debridement tomorrow per plastics  - fu blood cultures, OR cultures  - agree with vancomycin [id number] check trough prior to 4th dose  - continue with antibiotic coverage  - tolerating abx well so far; no side effects noted  - reason for abx use and side effects reviewed with patient  - supportive care  - fu cbc    2. other issues - care per medicine 52 y/o female with a PMHx of breast cancer s/p b/l mastectomy, cholelithiasis, GERD presents to the ED sent by Dr. Rosales. Pt s/p breast revision on 10/28/21, had initial b/l mastectomy/breast reconstruction 13 yrs ago. Noted with +fever 11/8 night, Tmax 101.5F, +nausea. Pt sent to the ED for admission for left breast infection. Fully vaccinated for COVID as of April 2021. Here wbc ct 17, eval by surgery, was given IV vancomycin/zosyn.     1. L breast infection. L breast infected hematoma. s/p breast revision surgery  - plan for surgical debridement tomorrow per plastics  - fu blood cultures, OR cultures  - agree with vancomycin [id number] check trough prior to 4th dose  - on IV zosyn 3.375q8h   - continue with antibiotic coverage  - tolerating abx well so far; no side effects noted  - reason for abx use and side effects reviewed with patient  - supportive care  - fu cbc    2. other issues - care per medicine

## 2021-11-09 NOTE — PHARMACOTHERAPY INTERVENTION NOTE - COMMENTS
Med history complete, reviewed medications and allergies with patient and confirmed medication list with doctor first med profile, all medication related questions answered

## 2021-11-09 NOTE — CONSULT NOTE ADULT - SUBJECTIVE AND OBJECTIVE BOX
Patient is a 51y old  Female who presents with a chief complaint of   HPI:      PMH: as above  PSH: as above  Meds: per reconciliation sheet, noted below  MEDICATIONS  (STANDING):  enoxaparin Injectable 40 milliGRAM(s) SubCutaneous daily  pantoprazole    Tablet 40 milliGRAM(s) Oral daily  piperacillin/tazobactam IVPB.. 3.375 Gram(s) IV Intermittent every 8 hours  sodium chloride 0.9%. 1000 milliLiter(s) (100 mL/Hr) IV Continuous <Continuous>  tamoxifen 20 milliGRAM(s) Oral daily  vancomycin  IVPB 1000 milliGRAM(s) IV Intermittent every 12 hours      Allergies    adhesives (Rash)  adhesives (Unknown)  hydrocodone (Other)    Intolerances      Social: no smoking, no alcohol, no illegal drugs; no recent travel, no exposure to TB  FAMILY HISTORY:  Family history of diabetes mellitus type II (Father)    Family history of breast cancer (Mother)    No significant family history (Sibling)       no history of premature cardiovascular disease in first degree relatives    ROS: + fever, + chills no HA, no dizziness, no sore throat, no blurry vision, no CP, no palpitations, no SOB, no cough, no abdominal pain, no diarrhea, no N/V, no dysuria, no leg pain, no claudication, no rash, no joint aches, no rectal pain or bleeding, no night sweats    All other systems reviewed and are negative    Vital Signs Last 24 Hrs  T(C): 36.7 (2021 11:05), Max: 36.7 (2021 11:05)  T(F): 98.1 (2021 11:05), Max: 98.1 (2021 11:05)  HR: 127 (2021 11:05) (127 - 127)  BP: 112/76 (2021 11:05) (112/76 - 112/76)  BP(mean): 88 (2021 11:05) (88 - 88)  RR: 18 (2021 11:05) (18 - 18)  SpO2: 100% (2021 11:05) (100% - 100%)  Daily Height in cm: 157.48 (2021 11:15)    Daily     PE:  Constitutional: NAD  HEENT: NC/AT, EOMI, PERRLA, conjunctivae clear; ears and nose atraumatic; pharynx benign  Neck: supple; thyroid not palpable  Back: no tenderness  Respiratory: respiratory effort normal; clear to auscultation  Cardiovascular: S1S2 regular, no murmurs  Abdomen: soft, not tender, not distended, positive BS; liver and spleen WNL  Genitourinary: no suprapubic tenderness  Lymphatic: no LN palpable  Musculoskeletal: no muscle tenderness, no joint swelling or tenderness  Extremities: no pedal edema  Neurological/ Psychiatric: AxOx3, Judgement and insight normal;  moving all extremities  Skin: L breast with tenderness, ecchymosis, warmth, R breast c/d/i    Labs: all available labs reviewed                        8.5    17.19 )-----------( 339      ( 2021 11:11 )             26.7         136  |  105  |  9   ----------------------------<  173<H>  3.5   |  21<L>  |  0.95    Ca    9.1      2021 11:11    TPro  7.4  /  Alb  3.8  /  TBili  0.6  /  DBili  x   /  AST  17  /  ALT  34  /  AlkPhos  89       LIVER FUNCTIONS - ( 2021 11:11 )  Alb: 3.8 g/dL / Pro: 7.4 gm/dL / ALK PHOS: 89 U/L / ALT: 34 U/L / AST: 17 U/L / GGT: x           Urinalysis Basic - ( 2021 11:37 )    Color: Yellow / Appearance: Clear / S.010 / pH: x  Gluc: x / Ketone: Negative  / Bili: Negative / Urobili: Negative mg/dL   Blood: x / Protein: Negative mg/dL / Nitrite: Negative   Leuk Esterase: Moderate / RBC: 0-2 /HPF / WBC 26-50   Sq Epi: x / Non Sq Epi: Moderate / Bacteria: Moderate          Radiology: all available radiological tests reviewed    Advanced directives addressed: full resuscitation Patient is a 51y old  Female who presents with a chief complaint of   HPI:  50 y/o female with a PMHx of breast cancer s/p b/l mastectomy, cholelithiasis, GERD presents to the ED sent by Dr. Rosales. Pt s/p breast revision on 10/28/21, had initial b/l mastectomy/breast reconstruction 13 yrs ago. Noted with +fever 11/8 night, Tmax 101.5F, +nausea. Pt sent to the ED for admission for left breast infection. Fully vaccinated for COVID as of 2021. Here wbc ct 17, eval by surgery, was given IV vancomycin/zosyn.     PAST MEDICAL HISTORY:  Breast Cancer right breast , treated with chemo and RT    Cholelithiasis     GERD (gastroesophageal reflux disease).     PAST SURGICAL HISTORY:  Breast Cancer mediport in place, mediport removed     Infection breast    S/P Bilateral Mastectomy with tram flap reconstruction     S/P  Section x2    S/P hernia repair incisional     S/P lymph node biopsy multiple right axillary lymph nodes removed     S/P Oophorectomy     S/P Shoulder Surgery rotator cuff .       Meds: per reconciliation sheet, noted below  MEDICATIONS  (STANDING):  enoxaparin Injectable 40 milliGRAM(s) SubCutaneous daily  pantoprazole    Tablet 40 milliGRAM(s) Oral daily  piperacillin/tazobactam IVPB.. 3.375 Gram(s) IV Intermittent every 8 hours  sodium chloride 0.9%. 1000 milliLiter(s) (100 mL/Hr) IV Continuous <Continuous>  tamoxifen 20 milliGRAM(s) Oral daily  vancomycin  IVPB 1000 milliGRAM(s) IV Intermittent every 12 hours      Allergies    adhesives (Rash)  adhesives (Unknown)  hydrocodone (Other)    Intolerances      Social: no smoking, no alcohol, no illegal drugs; no recent travel, no exposure to TB  FAMILY HISTORY:  Family history of diabetes mellitus type II (Father)    Family history of breast cancer (Mother)    No significant family history (Sibling)       no history of premature cardiovascular disease in first degree relatives    ROS: + fever, + chills no HA, no dizziness, no sore throat, no blurry vision, no CP, no palpitations, no SOB, no cough, no abdominal pain, no diarrhea, no N/V, no dysuria, no leg pain, no claudication, no rash, no joint aches, no rectal pain or bleeding, no night sweats    All other systems reviewed and are negative    Vital Signs Last 24 Hrs  T(C): 36.7 (2021 11:05), Max: 36.7 (2021 11:05)  T(F): 98.1 (2021 11:05), Max: 98.1 (2021 11:05)  HR: 127 (2021 11:05) (127 - 127)  BP: 112/76 (2021 11:05) (112/76 - 112/76)  BP(mean): 88 (2021 11:05) (88 - 88)  RR: 18 (2021 11:05) (18 - 18)  SpO2: 100% (2021 11:05) (100% - 100%)  Daily Height in cm: 157.48 (2021 11:15)    Daily     PE:  Constitutional: NAD  HEENT: NC/AT, EOMI, PERRLA, conjunctivae clear; ears and nose atraumatic; pharynx benign  Neck: supple; thyroid not palpable  Back: no tenderness  Respiratory: respiratory effort normal; clear to auscultation  Cardiovascular: S1S2 regular, no murmurs  Abdomen: soft, not tender, not distended, positive BS; liver and spleen WNL  Genitourinary: no suprapubic tenderness  Lymphatic: no LN palpable  Musculoskeletal: no muscle tenderness, no joint swelling or tenderness  Extremities: no pedal edema  Neurological/ Psychiatric: AxOx3, Judgement and insight normal;  moving all extremities  Skin: L breast with tenderness, ecchymosis, warmth, R breast c/d/i    Labs: all available labs reviewed                        8.5    17.19 )-----------( 339      ( 2021 11:11 )             26.7         136  |  105  |  9   ----------------------------<  173<H>  3.5   |  21<L>  |  0.95    Ca    9.1      2021 11:11    TPro  7.4  /  Alb  3.8  /  TBili  0.6  /  DBili  x   /  AST  17  /  ALT  34  /  AlkPhos  89       LIVER FUNCTIONS - ( 2021 11:11 )  Alb: 3.8 g/dL / Pro: 7.4 gm/dL / ALK PHOS: 89 U/L / ALT: 34 U/L / AST: 17 U/L / GGT: x           Urinalysis Basic - ( 2021 11:37 )    Color: Yellow / Appearance: Clear / S.010 / pH: x  Gluc: x / Ketone: Negative  / Bili: Negative / Urobili: Negative mg/dL   Blood: x / Protein: Negative mg/dL / Nitrite: Negative   Leuk Esterase: Moderate / RBC: 0-2 /HPF / WBC 26-50   Sq Epi: x / Non Sq Epi: Moderate / Bacteria: Moderate          Radiology: all available radiological tests reviewed    Advanced directives addressed: full resuscitation

## 2021-11-09 NOTE — ED ADULT NURSE NOTE - OBJECTIVE STATEMENT
Pt presents with left breast pain and infection s/p breast reduction on 10/28/2021. Pt states she took advil prior to arrival 7am this morning.

## 2021-11-09 NOTE — ED STATDOCS - ENMT, MLM
Nasal mucosa clear.  Mouth with normal mucosa  Throat has no vesicles, no oropharyngeal exudates and uvula is midline. Airway patent. Face mask in place

## 2021-11-09 NOTE — H&P ADULT - NSHPLABSRESULTS_GEN_ALL_CORE
8.5    17.19 )-----------( 339      ( 09 Nov 2021 11:11 )             26.7     PT/INR - ( 09 Nov 2021 11:11 )   PT: 13.5 sec;   INR: 1.17 ratio         PTT - ( 09 Nov 2021 11:11 )  PTT:29.3 sec  11-09    136  |  105  |  9   ----------------------------<  173<H>  3.5   |  21<L>  |  0.95    Ca    9.1      09 Nov 2021 11:11    TPro  7.4  /  Alb  3.8  /  TBili  0.6  /  DBili  x   /  AST  17  /  ALT  34  /  AlkPhos  89  11-09    Type and Screen

## 2021-11-09 NOTE — ED STATDOCS - ATTENDING CONTRIBUTION TO CARE
I, Junior Patton MD,  performed the initial face to face bedside interview with this patient regarding history of present illness, review of symptoms and relevant past medical, social and family history.  I completed an independent physical examination.  I was the initial provider who evaluated this patient. I have signed out the follow up of any pending tests (i.e. labs, radiological studies) to the resident.  I have communicated the patient’s plan of care and disposition with the resident.  The history, relevant review of systems, past medical and surgical history, medical decision making, and physical examination was documented by the scribe in my presence and I attest to the accuracy of the documentation.

## 2021-11-10 LAB
COVID-19 NUCLEOCAPSID GAM AB INTERP: NEGATIVE — SIGNIFICANT CHANGE UP
COVID-19 NUCLEOCAPSID TOTAL GAM ANTIBODY RESULT: 0.07 INDEX — SIGNIFICANT CHANGE UP
COVID-19 SPIKE DOMAIN AB INTERP: POSITIVE
COVID-19 SPIKE DOMAIN ANTIBODY RESULT: >250 U/ML — HIGH
CULTURE RESULTS: SIGNIFICANT CHANGE UP
SARS-COV-2 IGG+IGM SERPL QL IA: 0.07 INDEX — SIGNIFICANT CHANGE UP
SARS-COV-2 IGG+IGM SERPL QL IA: >250 U/ML — HIGH
SARS-COV-2 IGG+IGM SERPL QL IA: NEGATIVE — SIGNIFICANT CHANGE UP
SARS-COV-2 IGG+IGM SERPL QL IA: POSITIVE
SPECIMEN SOURCE: SIGNIFICANT CHANGE UP
VANCOMYCIN TROUGH SERPL-MCNC: 29.1 UG/ML — CRITICAL HIGH (ref 10–20)

## 2021-11-10 RX ORDER — ONDANSETRON 8 MG/1
4 TABLET, FILM COATED ORAL ONCE
Refills: 0 | Status: DISCONTINUED | OUTPATIENT
Start: 2021-11-10 | End: 2021-11-10

## 2021-11-10 RX ORDER — SODIUM CHLORIDE 9 MG/ML
1000 INJECTION INTRAMUSCULAR; INTRAVENOUS; SUBCUTANEOUS
Refills: 0 | Status: DISCONTINUED | OUTPATIENT
Start: 2021-11-10 | End: 2021-11-10

## 2021-11-10 RX ORDER — SODIUM CHLORIDE 9 MG/ML
1000 INJECTION, SOLUTION INTRAVENOUS
Refills: 0 | Status: DISCONTINUED | OUTPATIENT
Start: 2021-11-10 | End: 2021-11-10

## 2021-11-10 RX ORDER — FENTANYL CITRATE 50 UG/ML
50 INJECTION INTRAVENOUS
Refills: 0 | Status: DISCONTINUED | OUTPATIENT
Start: 2021-11-10 | End: 2021-11-10

## 2021-11-10 RX ORDER — SODIUM CHLORIDE 9 MG/ML
1000 INJECTION, SOLUTION INTRAVENOUS
Refills: 0 | Status: DISCONTINUED | OUTPATIENT
Start: 2021-11-10 | End: 2021-11-11

## 2021-11-10 RX ORDER — ACETAMINOPHEN 500 MG
1000 TABLET ORAL ONCE
Refills: 0 | Status: DISCONTINUED | OUTPATIENT
Start: 2021-11-10 | End: 2021-11-10

## 2021-11-10 RX ORDER — HYDROMORPHONE HYDROCHLORIDE 2 MG/ML
0.5 INJECTION INTRAMUSCULAR; INTRAVENOUS; SUBCUTANEOUS
Refills: 0 | Status: DISCONTINUED | OUTPATIENT
Start: 2021-11-10 | End: 2021-11-10

## 2021-11-10 RX ADMIN — HYDROMORPHONE HYDROCHLORIDE 0.5 MILLIGRAM(S): 2 INJECTION INTRAMUSCULAR; INTRAVENOUS; SUBCUTANEOUS at 13:31

## 2021-11-10 RX ADMIN — HYDROMORPHONE HYDROCHLORIDE 0.5 MILLIGRAM(S): 2 INJECTION INTRAMUSCULAR; INTRAVENOUS; SUBCUTANEOUS at 13:46

## 2021-11-10 RX ADMIN — Medication 250 MILLIGRAM(S): at 21:13

## 2021-11-10 RX ADMIN — OXYCODONE HYDROCHLORIDE 10 MILLIGRAM(S): 5 TABLET ORAL at 23:55

## 2021-11-10 RX ADMIN — PIPERACILLIN AND TAZOBACTAM 25 GRAM(S): 4; .5 INJECTION, POWDER, LYOPHILIZED, FOR SOLUTION INTRAVENOUS at 11:42

## 2021-11-10 RX ADMIN — SODIUM CHLORIDE 100 MILLILITER(S): 9 INJECTION INTRAMUSCULAR; INTRAVENOUS; SUBCUTANEOUS at 09:23

## 2021-11-10 RX ADMIN — PIPERACILLIN AND TAZOBACTAM 25 GRAM(S): 4; .5 INJECTION, POWDER, LYOPHILIZED, FOR SOLUTION INTRAVENOUS at 03:02

## 2021-11-10 RX ADMIN — PIPERACILLIN AND TAZOBACTAM 25 GRAM(S): 4; .5 INJECTION, POWDER, LYOPHILIZED, FOR SOLUTION INTRAVENOUS at 22:31

## 2021-11-10 RX ADMIN — HYDROMORPHONE HYDROCHLORIDE 0.5 MILLIGRAM(S): 2 INJECTION INTRAMUSCULAR; INTRAVENOUS; SUBCUTANEOUS at 09:19

## 2021-11-10 RX ADMIN — ONDANSETRON 4 MILLIGRAM(S): 8 TABLET, FILM COATED ORAL at 06:06

## 2021-11-10 RX ADMIN — PANTOPRAZOLE SODIUM 40 MILLIGRAM(S): 20 TABLET, DELAYED RELEASE ORAL at 09:31

## 2021-11-10 RX ADMIN — HYDROMORPHONE HYDROCHLORIDE 0.5 MILLIGRAM(S): 2 INJECTION INTRAMUSCULAR; INTRAVENOUS; SUBCUTANEOUS at 09:35

## 2021-11-10 RX ADMIN — SODIUM CHLORIDE 75 MILLILITER(S): 9 INJECTION, SOLUTION INTRAVENOUS at 18:36

## 2021-11-10 RX ADMIN — ONDANSETRON 4 MILLIGRAM(S): 8 TABLET, FILM COATED ORAL at 13:35

## 2021-11-10 RX ADMIN — Medication 250 MILLIGRAM(S): at 09:23

## 2021-11-10 RX ADMIN — HYDROMORPHONE HYDROCHLORIDE 0.5 MILLIGRAM(S): 2 INJECTION INTRAMUSCULAR; INTRAVENOUS; SUBCUTANEOUS at 05:58

## 2021-11-10 NOTE — PROGRESS NOTE ADULT - ASSESSMENT
52 y/o female with a PMHx of breast cancer s/p b/l mastectomy, cholelithiasis, GERD presents to the ED sent by Dr. Rosales. Pt s/p breast revision on 10/28/21, had initial b/l mastectomy/breast reconstruction 13 yrs ago. Noted with +fever 11/8 night, Tmax 101.5F, +nausea. Pt sent to the ED for admission for left breast infection. Fully vaccinated for COVID as of April 2021. Here wbc ct 17, eval by surgery, was given IV vancomycin/zosyn.     1. L breast infection. L breast infected hematoma. s/p breast revision surgery  - plan for surgical debridement per plastics  - fu blood cultures, OR cultures  - on vancomycin [id number] check trough prior to 4th dose #2  - on IV zosyn 3.375q8h #2   - continue with antibiotic coverage  - tolerating abx well so far; no side effects noted  - reason for abx use and side effects reviewed with patient  - supportive care  - fu cbc    2. other issues - care per medicine 52 y/o female with a PMHx of breast cancer s/p b/l mastectomy, cholelithiasis, GERD presents to the ED sent by Dr. Rosales. Pt s/p breast revision on 10/28/21, had initial b/l mastectomy/breast reconstruction 13 yrs ago. Noted with +fever 11/8 night, Tmax 101.5F, +nausea. Pt sent to the ED for admission for left breast infection. Fully vaccinated for COVID as of April 2021. Here wbc ct 17, eval by surgery, was given IV vancomycin/zosyn.     1. L breast infection. L breast infected hematoma. s/p breast revision surgery  - plan for surgical debridement per plastics  - fu blood cultures, OR cultures   - on vancomycin [id number] check trough prior to 4th dose #2  - on IV zosyn 3.375q8h #2   - continue with antibiotic coverage  - ua with pyuria f/u urine cx   - tolerating abx well so far; no side effects noted  - reason for abx use and side effects reviewed with patient  - supportive care  - fu cbc    2. other issues - care per medicine

## 2021-11-10 NOTE — PROGRESS NOTE ADULT - SUBJECTIVE AND OBJECTIVE BOX
Date of service: 11-10-21 @ 10:44    pt seen and examined  L breast tender, has discomfort  afebrile  plan for breast washout     ROS: no fever or chills; denies dizziness, no HA, no SOB or cough, no abdominal pain, no diarrhea or constipation; no dysuria, no urinary frequency, no legs pain, no rashes    MEDICATIONS  (STANDING):  enoxaparin Injectable 40 milliGRAM(s) SubCutaneous daily  pantoprazole    Tablet 40 milliGRAM(s) Oral daily  piperacillin/tazobactam IVPB.. 3.375 Gram(s) IV Intermittent every 8 hours  sodium chloride 0.9%. 1000 milliLiter(s) (100 mL/Hr) IV Continuous <Continuous>  tamoxifen 20 milliGRAM(s) Oral daily  vancomycin  IVPB 1000 milliGRAM(s) IV Intermittent every 12 hours    Vital Signs Last 24 Hrs  T(C): 36.8 (10 Nov 2021 08:22), Max: 37.8 (2021 20:28)  T(F): 98.3 (10 Nov 2021 08:22), Max: 100.1 (2021 20:28)  HR: 97 (10 Nov 2021 08:22) (97 - 127)  BP: 103/51 (10 Nov 2021 08:22) (103/51 - 136/64)  BP(mean): 89 (2021 13:30) (75 - 89)  RR: 17 (10 Nov 2021 08:22) (17 - 23)  SpO2: 95% (10 Nov 2021 08:22) (95% - 100%)    PE:  Constitutional: NAD  HEENT: NC/AT, EOMI, PERRLA, conjunctivae clear; ears and nose atraumatic; pharynx benign  Neck: supple; thyroid not palpable  Back: no tenderness  Respiratory: respiratory effort normal; clear to auscultation  Cardiovascular: S1S2 regular, no murmurs  Abdomen: soft, not tender, not distended, positive BS; liver and spleen WNL  Genitourinary: no suprapubic tenderness  Lymphatic: no LN palpable  Musculoskeletal: no muscle tenderness, no joint swelling or tenderness  Extremities: no pedal edema  Neurological/ Psychiatric: AxOx3, Judgement and insight normal;  moving all extremities  Skin: L breast with tenderness, ecchymosis, warmth, R breast c/d/i    Labs: all available labs reviewed                                   7.7    13.34 )-----------( 290      ( 2021 15:04 )             24.9     11-    138  |  111<H>  |  7   ----------------------------<  154<H>  3.4<L>   |  19<L>  |  0.78    Ca    8.1<L>      2021 15:04    TPro  7.4  /  Alb  3.8  /  TBili  0.6  /  DBili  x   /  AST  17  /  ALT  34  /  AlkPhos  89  11-        LIVER FUNCTIONS - ( 2021 11:11 )  Alb: 3.8 g/dL / Pro: 7.4 gm/dL / ALK PHOS: 89 U/L / ALT: 34 U/L / AST: 17 U/L / GGT: x           Urinalysis Basic - ( 2021 11:37 )    Color: Yellow / Appearance: Clear / S.010 / pH: x  Gluc: x / Ketone: Negative  / Bili: Negative / Urobili: Negative mg/dL   Blood: x / Protein: Negative mg/dL / Nitrite: Negative   Leuk Esterase: Moderate / RBC: 0-2 /HPF / WBC 26-50   Sq Epi: x / Non Sq Epi: Moderate / Bacteria: Moderate        Radiology: all available radiological tests reviewed    Advanced directives addressed: full resuscitation

## 2021-11-11 LAB
ANION GAP SERPL CALC-SCNC: 7 MMOL/L — SIGNIFICANT CHANGE UP (ref 5–17)
BUN SERPL-MCNC: 9 MG/DL — SIGNIFICANT CHANGE UP (ref 7–23)
CALCIUM SERPL-MCNC: 8.6 MG/DL — SIGNIFICANT CHANGE UP (ref 8.5–10.1)
CHLORIDE SERPL-SCNC: 105 MMOL/L — SIGNIFICANT CHANGE UP (ref 96–108)
CO2 SERPL-SCNC: 25 MMOL/L — SIGNIFICANT CHANGE UP (ref 22–31)
CREAT SERPL-MCNC: 0.78 MG/DL — SIGNIFICANT CHANGE UP (ref 0.5–1.3)
GLUCOSE SERPL-MCNC: 242 MG/DL — HIGH (ref 70–99)
HCT VFR BLD CALC: 24.7 % — LOW (ref 34.5–45)
HGB BLD-MCNC: 7.4 G/DL — LOW (ref 11.5–15.5)
MCHC RBC-ENTMCNC: 26.2 PG — LOW (ref 27–34)
MCHC RBC-ENTMCNC: 30 GM/DL — LOW (ref 32–36)
MCV RBC AUTO: 87.6 FL — SIGNIFICANT CHANGE UP (ref 80–100)
PLATELET # BLD AUTO: 286 K/UL — SIGNIFICANT CHANGE UP (ref 150–400)
POTASSIUM SERPL-MCNC: 3.8 MMOL/L — SIGNIFICANT CHANGE UP (ref 3.5–5.3)
POTASSIUM SERPL-SCNC: 3.8 MMOL/L — SIGNIFICANT CHANGE UP (ref 3.5–5.3)
RBC # BLD: 2.82 M/UL — LOW (ref 3.8–5.2)
RBC # FLD: 16.5 % — HIGH (ref 10.3–14.5)
SODIUM SERPL-SCNC: 137 MMOL/L — SIGNIFICANT CHANGE UP (ref 135–145)
VANCOMYCIN TROUGH SERPL-MCNC: 7.6 UG/ML — LOW (ref 10–20)
WBC # BLD: 14.34 K/UL — HIGH (ref 3.8–10.5)
WBC # FLD AUTO: 14.34 K/UL — HIGH (ref 3.8–10.5)

## 2021-11-11 RX ADMIN — Medication 250 MILLIGRAM(S): at 10:24

## 2021-11-11 RX ADMIN — OXYCODONE HYDROCHLORIDE 10 MILLIGRAM(S): 5 TABLET ORAL at 19:41

## 2021-11-11 RX ADMIN — PIPERACILLIN AND TAZOBACTAM 25 GRAM(S): 4; .5 INJECTION, POWDER, LYOPHILIZED, FOR SOLUTION INTRAVENOUS at 22:48

## 2021-11-11 RX ADMIN — OXYCODONE HYDROCHLORIDE 10 MILLIGRAM(S): 5 TABLET ORAL at 10:00

## 2021-11-11 RX ADMIN — ENOXAPARIN SODIUM 40 MILLIGRAM(S): 100 INJECTION SUBCUTANEOUS at 13:53

## 2021-11-11 RX ADMIN — PIPERACILLIN AND TAZOBACTAM 25 GRAM(S): 4; .5 INJECTION, POWDER, LYOPHILIZED, FOR SOLUTION INTRAVENOUS at 05:58

## 2021-11-11 RX ADMIN — SODIUM CHLORIDE 75 MILLILITER(S): 9 INJECTION, SOLUTION INTRAVENOUS at 06:01

## 2021-11-11 RX ADMIN — Medication 250 MILLIGRAM(S): at 21:29

## 2021-11-11 RX ADMIN — OXYCODONE HYDROCHLORIDE 10 MILLIGRAM(S): 5 TABLET ORAL at 09:06

## 2021-11-11 RX ADMIN — PIPERACILLIN AND TAZOBACTAM 25 GRAM(S): 4; .5 INJECTION, POWDER, LYOPHILIZED, FOR SOLUTION INTRAVENOUS at 13:46

## 2021-11-11 RX ADMIN — OXYCODONE HYDROCHLORIDE 10 MILLIGRAM(S): 5 TABLET ORAL at 00:34

## 2021-11-11 NOTE — PROGRESS NOTE ADULT - SUBJECTIVE AND OBJECTIVE BOX
Feels much better.   AVSS  Left breast closures intact. No collection. Drains serosang., stripped.  Hct - 24.7  WBC - 14.3  Left breast wound cx from OR pending

## 2021-11-11 NOTE — PROGRESS NOTE ADULT - SUBJECTIVE AND OBJECTIVE BOX
Writer was informed of  patient H/H  was 7.4/24.7 from 7.7/24.9 s/p L Breast washout and evacuation of hematoma on 11/10/2021. Dr Rosales was informed  and patient will not be transfused.    Patient  was seen at the bedside and pt denies any chest pain, headaches, palpitations, shortness of breath, dizziness.

## 2021-11-11 NOTE — PROGRESS NOTE ADULT - ASSESSMENT
50 y/o female with a PMHx of breast cancer s/p b/l mastectomy, cholelithiasis, GERD presents to the ED sent by Dr. Rosales. Pt s/p breast revision on 10/28/21, had initial b/l mastectomy/breast reconstruction 13 yrs ago. Noted with +fever 11/8 night, Tmax 101.5F, +nausea. Pt sent to the ED for admission for left breast infection. Fully vaccinated for COVID as of April 2021. Here wbc ct 17, eval by surgery, was given IV vancomycin/zosyn.     1. L breast infection. L breast infected hematoma. s/p breast revision surgery  - s/p L breast debridement, hematoma evacuation 11/10    - blood cultures no growth, OR cultures   - on vancomycin [id number] trough inaccurate as was checked during infusion f/u repeat trough, maintain current dose #3  - on IV zosyn 3.375q8h #3  - continue with antibiotic coverage  - tolerating abx well so far; no side effects noted  - reason for abx use and side effects reviewed with patient  - supportive care  - fu cbc    2. other issues - care per medicine

## 2021-11-11 NOTE — PROGRESS NOTE ADULT - SUBJECTIVE AND OBJECTIVE BOX
Date of service: 11-11-21 @ 09:50    pt seen and examined  s/p L breast washout   200 cc hematoma evacuated  feels better this am  afebrile      ROS: no fever or chills; denies dizziness, no HA, no SOB or cough, no abdominal pain, no diarrhea or constipation; no dysuria, no urinary frequency, no legs pain, no rashes    MEDICATIONS  (STANDING):  enoxaparin Injectable 40 milliGRAM(s) SubCutaneous daily  lactated ringers. 1000 milliLiter(s) (75 mL/Hr) IV Continuous <Continuous>  pantoprazole    Tablet 40 milliGRAM(s) Oral daily  piperacillin/tazobactam IVPB.. 3.375 Gram(s) IV Intermittent every 8 hours  sodium chloride 0.9%. 1000 milliLiter(s) (100 mL/Hr) IV Continuous <Continuous>  tamoxifen 20 milliGRAM(s) Oral daily  vancomycin  IVPB 1000 milliGRAM(s) IV Intermittent every 12 hours    Vital Signs Last 24 Hrs  T(C): 37.1 (11 Nov 2021 08:20), Max: 37.1 (11 Nov 2021 08:20)  T(F): 98.8 (11 Nov 2021 08:20), Max: 98.8 (11 Nov 2021 08:20)  HR: 94 (11 Nov 2021 08:20) (81 - 94)  BP: 108/57 (11 Nov 2021 08:20) (96/63 - 108/57)  BP(mean): --  RR: 17 (11 Nov 2021 08:20) (16 - 17)  SpO2: 99% (11 Nov 2021 08:20) (95% - 99%)        PE:  Constitutional: NAD  HEENT: NC/AT, EOMI, PERRLA, conjunctivae clear; ears and nose atraumatic; pharynx benign  Neck: supple; thyroid not palpable  Back: no tenderness  Respiratory: respiratory effort normal; clear to auscultation  Cardiovascular: S1S2 regular, no murmurs  Abdomen: soft, not tender, not distended, positive BS; liver and spleen WNL  Genitourinary: no suprapubic tenderness  Lymphatic: no LN palpable  Musculoskeletal: no muscle tenderness, no joint swelling or tenderness  Extremities: no pedal edema  Neurological/ Psychiatric: AxOx3, Judgement and insight normal;  moving all extremities  Skin: L breast with less tenderness, ecchymosis, warmth, R breast c/d/i    Labs: all available labs reviewed                                   7.4    14.34 )-----------( 286      ( 11 Nov 2021 08:57 )             24.7     11-11    137  |  105  |  9   ----------------------------<  242<H>  3.8   |  25  |  0.78    Ca    8.6      11 Nov 2021 08:57    TPro  7.4  /  Alb  3.8  /  TBili  0.6  /  DBili  x   /  AST  17  /  ALT  34  /  AlkPhos  89  11-09      Vancomycin Level, Trough: 29.1 ug/mL (11-10 @ 21:38)      Cultures:     Culture - Urine (11.09.21 @ 11:37)   Specimen Source: Clean Catch None   Culture Results:   10,000 - 49,000 CFU/mL Coag negative Staphylococcus not Staph   saprophyticus Susceptibilites not performed.   Normal Urogenital karsten present   Culture - Blood (11.09.21 @ 11:37)   Specimen Source: .Blood None   Culture Results:   No growth to date.  Culture - Blood (11.09.21 @ 11:11)   Specimen Source: .Blood Blood-Peripheral   Culture Results:   No growth to date.       Radiology: all available radiological tests reviewed    Advanced directives addressed: full resuscitation

## 2021-11-11 NOTE — PROGRESS NOTE ADULT - ASSESSMENT
AP - Stable after left breast exploration with evacuation of infected hematoma yesterday.  - ID input appreciated.  - Continue drain and antibiotics.

## 2021-11-12 LAB
ANION GAP SERPL CALC-SCNC: 7 MMOL/L — SIGNIFICANT CHANGE UP (ref 5–17)
BUN SERPL-MCNC: 9 MG/DL — SIGNIFICANT CHANGE UP (ref 7–23)
CALCIUM SERPL-MCNC: 8.9 MG/DL — SIGNIFICANT CHANGE UP (ref 8.5–10.1)
CHLORIDE SERPL-SCNC: 106 MMOL/L — SIGNIFICANT CHANGE UP (ref 96–108)
CO2 SERPL-SCNC: 27 MMOL/L — SIGNIFICANT CHANGE UP (ref 22–31)
CREAT SERPL-MCNC: 0.72 MG/DL — SIGNIFICANT CHANGE UP (ref 0.5–1.3)
GLUCOSE SERPL-MCNC: 108 MG/DL — HIGH (ref 70–99)
HCT VFR BLD CALC: 24.3 % — LOW (ref 34.5–45)
HGB BLD-MCNC: 7.2 G/DL — LOW (ref 11.5–15.5)
MCHC RBC-ENTMCNC: 25.6 PG — LOW (ref 27–34)
MCHC RBC-ENTMCNC: 29.6 GM/DL — LOW (ref 32–36)
MCV RBC AUTO: 86.5 FL — SIGNIFICANT CHANGE UP (ref 80–100)
PLATELET # BLD AUTO: 309 K/UL — SIGNIFICANT CHANGE UP (ref 150–400)
POTASSIUM SERPL-MCNC: 4 MMOL/L — SIGNIFICANT CHANGE UP (ref 3.5–5.3)
POTASSIUM SERPL-SCNC: 4 MMOL/L — SIGNIFICANT CHANGE UP (ref 3.5–5.3)
RBC # BLD: 2.81 M/UL — LOW (ref 3.8–5.2)
RBC # FLD: 16.7 % — HIGH (ref 10.3–14.5)
SODIUM SERPL-SCNC: 140 MMOL/L — SIGNIFICANT CHANGE UP (ref 135–145)
WBC # BLD: 5.94 K/UL — SIGNIFICANT CHANGE UP (ref 3.8–10.5)
WBC # FLD AUTO: 5.94 K/UL — SIGNIFICANT CHANGE UP (ref 3.8–10.5)

## 2021-11-12 RX ORDER — ALPRAZOLAM 0.25 MG
0.25 TABLET ORAL AT BEDTIME
Refills: 0 | Status: DISCONTINUED | OUTPATIENT
Start: 2021-11-12 | End: 2021-11-13

## 2021-11-12 RX ADMIN — PIPERACILLIN AND TAZOBACTAM 25 GRAM(S): 4; .5 INJECTION, POWDER, LYOPHILIZED, FOR SOLUTION INTRAVENOUS at 06:04

## 2021-11-12 RX ADMIN — Medication 250 MILLIGRAM(S): at 21:02

## 2021-11-12 RX ADMIN — Medication 250 MILLIGRAM(S): at 10:12

## 2021-11-12 RX ADMIN — PIPERACILLIN AND TAZOBACTAM 25 GRAM(S): 4; .5 INJECTION, POWDER, LYOPHILIZED, FOR SOLUTION INTRAVENOUS at 16:38

## 2021-11-12 RX ADMIN — Medication 0.25 MILLIGRAM(S): at 00:55

## 2021-11-12 RX ADMIN — PANTOPRAZOLE SODIUM 40 MILLIGRAM(S): 20 TABLET, DELAYED RELEASE ORAL at 10:12

## 2021-11-12 RX ADMIN — OXYCODONE HYDROCHLORIDE 10 MILLIGRAM(S): 5 TABLET ORAL at 12:43

## 2021-11-12 NOTE — PROGRESS NOTE ADULT - ASSESSMENT
AP - Stable after left breast exploration with evacuation of infected hematoma yesterday.  - ID input appreciated.  - Continue drain and antibiotics.  - Possible discharge tomorrow on PO antibiotics.

## 2021-11-12 NOTE — PROVIDER CONTACT NOTE (OTHER) - BACKGROUND
Pt s/p breast revision and reduction in 10/2021, admitted for left breast hematoma evacuation and wash out

## 2021-11-12 NOTE — PROGRESS NOTE ADULT - ASSESSMENT
50 y/o female with a PMHx of breast cancer s/p b/l mastectomy, cholelithiasis, GERD presents to the ED sent by Dr. Rosales. Pt s/p breast revision on 10/28/21, had initial b/l mastectomy/breast reconstruction 13 yrs ago. Noted with +fever 11/8 night, Tmax 101.5F, +nausea. Pt sent to the ED for admission for left breast infection. Fully vaccinated for COVID as of April 2021. Here wbc ct 17, eval by surgery, was given IV vancomycin/zosyn.     1. L breast infection. L breast infected hematoma. s/p breast revision surgery  - s/p L breast debridement, hematoma evacuation 11/10    - blood cultures no growth, OR cultures prelim with enterobacter cloacae f/u final cx  - on vancomycin [id number] #4  - on IV zosyn 3.375q8h #4  - continue with antibiotic coverage  - tailor abx regimen based on cx results  - dc vancomycin in am if no gram positive orgs in cx  - tolerating abx well so far; no side effects noted  - reason for abx use and side effects reviewed with patient  - supportive care  - fu cbc    2. other issues - care per medicine

## 2021-11-12 NOTE — PROGRESS NOTE ADULT - SUBJECTIVE AND OBJECTIVE BOX
Continues to feel better. No pain.  AVSS  Left breast closures intact. No collection. Drain serosang., stripped.  Hct - 24.3  WBC - 5.9  Left breast wound cx from OR - rare enterobacter

## 2021-11-12 NOTE — PROGRESS NOTE ADULT - SUBJECTIVE AND OBJECTIVE BOX
Date of service: 11-11-21 @ 09:50    pt seen and examined  s/p L breast washout 11/10  feels better   less L breast discomfort  drains in place  afebrile    ROS: no fever or chills; denies dizziness, no HA, no SOB or cough, no abdominal pain, no diarrhea or constipation; no dysuria, no urinary frequency, no legs pain, no rashes    MEDICATIONS  (STANDING):  enoxaparin Injectable 40 milliGRAM(s) SubCutaneous daily  lactated ringers. 1000 milliLiter(s) (75 mL/Hr) IV Continuous <Continuous>  pantoprazole    Tablet 40 milliGRAM(s) Oral daily  piperacillin/tazobactam IVPB.. 3.375 Gram(s) IV Intermittent every 8 hours  sodium chloride 0.9%. 1000 milliLiter(s) (100 mL/Hr) IV Continuous <Continuous>  tamoxifen 20 milliGRAM(s) Oral daily  vancomycin  IVPB 1000 milliGRAM(s) IV Intermittent every 12 hours    Vital Signs Last 24 Hrs  T(C): 37.1 (11 Nov 2021 08:20), Max: 37.1 (11 Nov 2021 08:20)  T(F): 98.8 (11 Nov 2021 08:20), Max: 98.8 (11 Nov 2021 08:20)  HR: 94 (11 Nov 2021 08:20) (81 - 94)  BP: 108/57 (11 Nov 2021 08:20) (96/63 - 108/57)  BP(mean): --  RR: 17 (11 Nov 2021 08:20) (16 - 17)  SpO2: 99% (11 Nov 2021 08:20) (95% - 99%)        PE:  Constitutional: NAD  HEENT: NC/AT, EOMI, PERRLA, conjunctivae clear; ears and nose atraumatic; pharynx benign  Neck: supple; thyroid not palpable  Back: no tenderness  Respiratory: respiratory effort normal; clear to auscultation  Cardiovascular: S1S2 regular, no murmurs  Abdomen: soft, not tender, not distended, positive BS; liver and spleen WNL  Genitourinary: no suprapubic tenderness  Lymphatic: no LN palpable  Musculoskeletal: no muscle tenderness, no joint swelling or tenderness  Extremities: no pedal edema  Neurological/ Psychiatric: AxOx3, Judgement and insight normal;  moving all extremities  Skin: L breast with less tenderness, ecchymosis, warmth, drains in place, R breast c/d/i    Labs: all available labs reviewed                                              7.2    5.94  )-----------( 309      ( 12 Nov 2021 09:57 )             24.3     11-12    140  |  106  |  9   ----------------------------<  108<H>  4.0   |  27  |  0.72    Ca    8.9      12 Nov 2021 09:57          Cultures:     Culture - Urine (11.09.21 @ 11:37)   Specimen Source: Clean Catch None   Culture Results:   10,000 - 49,000 CFU/mL Coag negative Staphylococcus not Staph   saprophyticus Susceptibilites not performed.   Normal Urogenital karsten present   Culture - Blood (11.09.21 @ 11:37)   Specimen Source: .Blood None   Culture Results:   No growth to date.  Culture - Blood (11.09.21 @ 11:11)   Specimen Source: .Blood Blood-Peripheral   Culture Results:   No growth to date.   Culture - Surgical Swab (11.10.21 @ 14:34)   Specimen Source: .Surgical Swab LEFT BREAST WOUND CULTURE   Culture Results:   Rare Enterobacter cloacae complex     Radiology: all available radiological tests reviewed    Advanced directives addressed: full resuscitation Date of service: 11-12-21 @ 12:28    pt seen and examined  s/p L breast washout 11/10  drain in place  L breast improving  afebrile    ROS: no fever or chills; denies dizziness, no HA, no SOB or cough, no abdominal pain, no diarrhea or constipation; no dysuria, no urinary frequency, no legs pain, no rashes    MEDICATIONS  (STANDING):  enoxaparin Injectable 40 milliGRAM(s) SubCutaneous daily  lactated ringers. 1000 milliLiter(s) (75 mL/Hr) IV Continuous <Continuous>  pantoprazole    Tablet 40 milliGRAM(s) Oral daily  piperacillin/tazobactam IVPB.. 3.375 Gram(s) IV Intermittent every 8 hours  sodium chloride 0.9%. 1000 milliLiter(s) (100 mL/Hr) IV Continuous <Continuous>  tamoxifen 20 milliGRAM(s) Oral daily  vancomycin  IVPB 1000 milliGRAM(s) IV Intermittent every 12 hours    Vital Signs Last 24 Hrs  T(C): 37.1 (11 Nov 2021 08:20), Max: 37.1 (11 Nov 2021 08:20)  T(F): 98.8 (11 Nov 2021 08:20), Max: 98.8 (11 Nov 2021 08:20)  HR: 94 (11 Nov 2021 08:20) (81 - 94)  BP: 108/57 (11 Nov 2021 08:20) (96/63 - 108/57)  BP(mean): --  RR: 17 (11 Nov 2021 08:20) (16 - 17)  SpO2: 99% (11 Nov 2021 08:20) (95% - 99%)      PE:  Constitutional: NAD  HEENT: NC/AT, EOMI, PERRLA, conjunctivae clear; ears and nose atraumatic; pharynx benign  Neck: supple; thyroid not palpable  Back: no tenderness  Respiratory: respiratory effort normal; clear to auscultation  Cardiovascular: S1S2 regular, no murmurs  Abdomen: soft, not tender, not distended, positive BS; liver and spleen WNL  Genitourinary: no suprapubic tenderness  Lymphatic: no LN palpable  Musculoskeletal: no muscle tenderness, no joint swelling or tenderness  Extremities: no pedal edema  Neurological/ Psychiatric: AxOx3, Judgement and insight normal;  moving all extremities  Skin: L breast with less tenderness, ecchymosis, warmth, drains in place, R breast c/d/i    Labs: all available labs reviewed                                              7.2    5.94  )-----------( 309      ( 12 Nov 2021 09:57 )             24.3     11-12    140  |  106  |  9   ----------------------------<  108<H>  4.0   |  27  |  0.72    Ca    8.9      12 Nov 2021 09:57          Cultures:     Culture - Urine (11.09.21 @ 11:37)   Specimen Source: Clean Catch None   Culture Results:   10,000 - 49,000 CFU/mL Coag negative Staphylococcus not Staph   saprophyticus Susceptibilites not performed.   Normal Urogenital karsten present   Culture - Blood (11.09.21 @ 11:37)   Specimen Source: .Blood None   Culture Results:   No growth to date.  Culture - Blood (11.09.21 @ 11:11)   Specimen Source: .Blood Blood-Peripheral   Culture Results:   No growth to date.   Culture - Surgical Swab (11.10.21 @ 14:34)   Specimen Source: .Surgical Swab LEFT BREAST WOUND CULTURE   Culture Results:   Rare Enterobacter cloacae complex     Radiology: all available radiological tests reviewed    Advanced directives addressed: full resuscitation

## 2021-11-13 ENCOUNTER — TRANSCRIPTION ENCOUNTER (OUTPATIENT)
Age: 51
End: 2021-11-13

## 2021-11-13 VITALS
RESPIRATION RATE: 18 BRPM | DIASTOLIC BLOOD PRESSURE: 63 MMHG | OXYGEN SATURATION: 100 % | HEART RATE: 94 BPM | TEMPERATURE: 98 F | SYSTOLIC BLOOD PRESSURE: 117 MMHG

## 2021-11-13 PROCEDURE — 99238 HOSP IP/OBS DSCHRG MGMT 30/<: CPT

## 2021-11-13 RX ORDER — MOXIFLOXACIN HYDROCHLORIDE TABLETS, 400 MG 400 MG/1
1 TABLET, FILM COATED ORAL
Qty: 14 | Refills: 0
Start: 2021-11-13 | End: 2021-11-19

## 2021-11-13 RX ORDER — CIPROFLOXACIN LACTATE 400MG/40ML
1 VIAL (ML) INTRAVENOUS
Qty: 28 | Refills: 0
Start: 2021-11-13 | End: 2021-11-26

## 2021-11-13 RX ORDER — ALPRAZOLAM 0.25 MG
1 TABLET ORAL
Qty: 5 | Refills: 0
Start: 2021-11-13 | End: 2021-11-17

## 2021-11-13 RX ORDER — TAMOXIFEN CITRATE 20 MG/1
1 TABLET, FILM COATED ORAL
Qty: 0 | Refills: 0 | DISCHARGE
Start: 2021-11-13

## 2021-11-13 RX ADMIN — PIPERACILLIN AND TAZOBACTAM 25 GRAM(S): 4; .5 INJECTION, POWDER, LYOPHILIZED, FOR SOLUTION INTRAVENOUS at 07:42

## 2021-11-13 RX ADMIN — PIPERACILLIN AND TAZOBACTAM 25 GRAM(S): 4; .5 INJECTION, POWDER, LYOPHILIZED, FOR SOLUTION INTRAVENOUS at 00:25

## 2021-11-13 NOTE — PROGRESS NOTE ADULT - ASSESSMENT
AP - Improved after left breast evacuation infected hematoma.  - Cultures and sensitivities noted.  - Continue drain and antibiotics.  - Anticipate discharge today on PO antibiotics as guided by ID.  - May shower at home.  - Follow-up in  office Thursday.

## 2021-11-13 NOTE — DISCHARGE NOTE NURSING/CASE MANAGEMENT/SOCIAL WORK - PATIENT PORTAL LINK FT
You can access the FollowMyHealth Patient Portal offered by [facility] by registering at the following website: [email or web address]. By joining [facility]’s FollowMyHealth portal, you will also be able to view your health information using other applications (apps) compatible with our system.

## 2021-11-13 NOTE — PROGRESS NOTE ADULT - SUBJECTIVE AND OBJECTIVE BOX
pt seen and examined  s/p L breast washout 11/10  feels better   less L breast discomfort  drains in place  afebrile    ROS: no fever or chills; denies dizziness, no HA, no SOB or cough, no abdominal pain, no diarrhea or constipation; no dysuria, no urinary frequency, no legs pain, no rashes    MEDICATIONS  (STANDING):  enoxaparin Injectable 40 milliGRAM(s) SubCutaneous daily  lactated ringers. 1000 milliLiter(s) (75 mL/Hr) IV Continuous <Continuous>  pantoprazole    Tablet 40 milliGRAM(s) Oral daily  piperacillin/tazobactam IVPB.. 3.375 Gram(s) IV Intermittent every 8 hours  sodium chloride 0.9%. 1000 milliLiter(s) (100 mL/Hr) IV Continuous <Continuous>  tamoxifen 20 milliGRAM(s) Oral daily  vancomycin  IVPB 1000 milliGRAM(s) IV Intermittent every 12 hours    Vital Signs Last 24 Hrs  T(C): 37.1 (11 Nov 2021 08:20), Max: 37.1 (11 Nov 2021 08:20)  T(F): 98.8 (11 Nov 2021 08:20), Max: 98.8 (11 Nov 2021 08:20)  HR: 94 (11 Nov 2021 08:20) (81 - 94)  BP: 108/57 (11 Nov 2021 08:20) (96/63 - 108/57)  BP(mean): --  RR: 17 (11 Nov 2021 08:20) (16 - 17)  SpO2: 99% (11 Nov 2021 08:20) (95% - 99%)        PE:  Constitutional: NAD  HEENT: NC/AT, EOMI, PERRLA, conjunctivae clear; ears and nose atraumatic; pharynx benign  Neck: supple; thyroid not palpable  Back: no tenderness  Respiratory: respiratory effort normal; clear to auscultation  Cardiovascular: S1S2 regular, no murmurs  Abdomen: soft, not tender, not distended, positive BS; liver and spleen WNL  Genitourinary: no suprapubic tenderness  Lymphatic: no LN palpable  Musculoskeletal: no muscle tenderness, no joint swelling or tenderness  Extremities: no pedal edema  Neurological/ Psychiatric: AxOx3, Judgement and insight normal;  moving all extremities  Skin: L breast with less tenderness, ecchymosis, warmth, drains in place, R breast c/d/i    Labs: all available labs reviewed                                              7.2    5.94  )-----------( 309      ( 12 Nov 2021 09:57 )             24.3     11-12    140  |  106  |  9   ----------------------------<  108<H>  4.0   |  27  |  0.72    Ca    8.9      12 Nov 2021 09:57          Cultures:     Culture - Urine (11.09.21 @ 11:37)   Specimen Source: Clean Catch None   Culture Results:   10,000 - 49,000 CFU/mL Coag negative Staphylococcus not Staph   saprophyticus Susceptibilites not performed.   Normal Urogenital karsten present   Culture - Blood (11.09.21 @ 11:37)   Specimen Source: .Blood None   Culture Results:   No growth to date.  Culture - Blood (11.09.21 @ 11:11)   Specimen Source: .Blood Blood-Peripheral   Culture Results:   No growth to date.   Culture - Surgical Swab (11.10.21 @ 14:34)   Specimen Source: .Surgical Swab LEFT BREAST WOUND CULTURE   Culture Results:   Rare Enterobacter cloacae complex     Radiology: all available radiological tests reviewed    Advanced directives addressed: full resuscitation Date of service: 11-13-21 @ 11:32    pt seen and examined  s/p L breast washout 11/10  L breast much better  less warmth, bruising   afebrile    ROS: no fever or chills; denies dizziness, no HA, no SOB or cough, no abdominal pain, no diarrhea or constipation; no dysuria, no urinary frequency, no legs pain, no rashes    MEDICATIONS  (STANDING):  enoxaparin Injectable 40 milliGRAM(s) SubCutaneous daily  pantoprazole    Tablet 40 milliGRAM(s) Oral daily  piperacillin/tazobactam IVPB.. 3.375 Gram(s) IV Intermittent every 8 hours  tamoxifen 20 milliGRAM(s) Oral daily  vancomycin  IVPB 1000 milliGRAM(s) IV Intermittent every 12 hours    Vital Signs Last 24 Hrs  T(C): 36.9 (13 Nov 2021 09:33), Max: 36.9 (13 Nov 2021 09:33)  T(F): 98.4 (13 Nov 2021 09:33), Max: 98.4 (13 Nov 2021 09:33)  HR: 94 (13 Nov 2021 09:33) (92 - 100)  BP: 117/63 (13 Nov 2021 09:33) (102/55 - 117/63)  BP(mean): --  RR: 18 (13 Nov 2021 09:33) (16 - 18)  SpO2: 100% (13 Nov 2021 09:33) (97% - 100%)      PE:  Constitutional: NAD  HEENT: NC/AT, EOMI, PERRLA, conjunctivae clear; ears and nose atraumatic; pharynx benign  Neck: supple; thyroid not palpable  Back: no tenderness  Respiratory: respiratory effort normal; clear to auscultation  Cardiovascular: S1S2 regular, no murmurs  Abdomen: soft, not tender, not distended, positive BS; liver and spleen WNL  Genitourinary: no suprapubic tenderness  Lymphatic: no LN palpable  Musculoskeletal: no muscle tenderness, no joint swelling or tenderness  Extremities: no pedal edema  Neurological/ Psychiatric: AxOx3, Judgement and insight normal;  moving all extremities  Skin: L breast with resolving ecchymosis, warmth, drain in place, R breast c/d/i    Labs: all available labs reviewed                                              7.2    5.94  )-----------( 309      ( 12 Nov 2021 09:57 )             24.3     11-12    140  |  106  |  9   ----------------------------<  108<H>  4.0   |  27  |  0.72    Ca    8.9      12 Nov 2021 09:57        Cultures:     Culture - Urine (11.09.21 @ 11:37)   Specimen Source: Clean Catch None   Culture Results:   10,000 - 49,000 CFU/mL Coag negative Staphylococcus not Staph   saprophyticus Susceptibilites not performed.   Normal Urogenital karsten present   Culture - Blood (11.09.21 @ 11:37)   Specimen Source: .Blood None   Culture Results:   No growth to date.  Culture - Blood (11.09.21 @ 11:11)   Specimen Source: .Blood Blood-Peripheral   Culture Results:   No growth to date.   Culture - Surgical Swab (11.10.21 @ 14:34)   Specimen Source: .Surgical Swab LEFT BREAST WOUND CULTURE   Culture Results:   Rare Enterobacter cloacae complex     Radiology: all available radiological tests reviewed    Advanced directives addressed: full resuscitation

## 2021-11-13 NOTE — PROGRESS NOTE ADULT - REASON FOR ADMISSION
left breast hematoma / infection

## 2021-11-13 NOTE — DISCHARGE NOTE PROVIDER - CARE PROVIDER_API CALL
Helio Rosales)  Plastic Surgery  [address], [address]  [place] [address]  Phone: [phone]  Fax: [phone]  Established Patient  Follow Up Time:

## 2021-11-13 NOTE — PROGRESS NOTE ADULT - ASSESSMENT
52 y/o female with a PMHx of breast cancer s/p b/l mastectomy, cholelithiasis, GERD presents to the ED sent by Dr. Rosales. Pt s/p breast revision on 10/28/21, had initial b/l mastectomy/breast reconstruction 13 yrs ago. Noted with +fever 11/8 night, Tmax 101.5F, +nausea. Pt sent to the ED for admission for left breast infection. Fully vaccinated for COVID as of April 2021. Here wbc ct 17, eval by surgery, was given IV vancomycin/zosyn.     1. L breast infection. L breast infected hematoma. s/p breast revision surgery  - s/p L breast debridement, hematoma evacuation 11/10    - blood cultures no growth, OR cultures prelim with enterobacter cloacae f/u final cx  - on vancomycin 3dth72s #4  - on IV zosyn 3.375q8h #4  - continue with antibiotic coverage  - tailor abx regimen based on cx results  - dc vancomycin in am if no gram positive orgs in cx  - tolerating abx well so far; no side effects noted  - reason for abx use and side effects reviewed with patient  - supportive care  - fu cbc    2. other issues - care per medicine 52 y/o female with a PMHx of breast cancer s/p b/l mastectomy, cholelithiasis, GERD presents to the ED sent by Dr. Rosales. Pt s/p breast revision on 10/28/21, had initial b/l mastectomy/breast reconstruction 13 yrs ago. Noted with +fever 11/8 night, Tmax 101.5F, +nausea. Pt sent to the ED for admission for left breast infection. Fully vaccinated for COVID as of April 2021. Here wbc ct 17, eval by surgery, was given IV vancomycin/zosyn.     1. L breast infection. L breast infected hematoma. s/p breast revision surgery  - s/p L breast debridement, hematoma evacuation 11/10    - blood cultures no growth, OR cultures prelim with enterobacter cloacae, staph aureus  - on vancomycin [id number] #5  - on IV zosyn 3.375q8h #5  - continue with antibiotic coverage  - dc with oral doxycycline 100mg BID and cipro 500mg BID 14 days  - f/u sensitivities of staph aureus cx  - tolerating abx well so far; no side effects noted  - reason for abx use and side effects reviewed with patient  - supportive care  - fu cbc    2. other issues - care per medicine

## 2021-11-13 NOTE — DISCHARGE NOTE PROVIDER - HOSPITAL COURSE
This is a 52 y/o female with a PMHx of cholelithiasis, GERD, breast cancer s/p b/l mastectomy with ALLEN 13 years ago with recent breast revision and reduction on 10/28/2021 and drain removal 1 week later. She reports persistent worsening left breast pain since discharge from her revision, as well as intermittent fevers, chills and nausea. S/P left breast revision  with hematoma and infection, pt tolerated procedure well, to be discharged with DENISA's and to follow up with DR Rosales on thursday 11/17/2021

## 2021-11-13 NOTE — PROGRESS NOTE ADULT - SUBJECTIVE AND OBJECTIVE BOX
Feeling better overall. Notes less swelling left breast. No pain.  AVSS  Left breast closures intact. No collection. Drain serosang., stripped.  Left breast wound cx from OR - Enterobacter cloacae, sensitivities noted

## 2021-11-13 NOTE — DISCHARGE NOTE PROVIDER - NSDCMRMEDTOKEN_GEN_ALL_CORE_FT
ciprofloxacin 500 mg oral tablet: 1 tab(s) orally 2 times a day   doxycycline monohydrate 100 mg oral tablet: 1 tab(s) orally 2 times a day   oxycodone-acetaminophen 5 mg-325 mg oral tablet: 1 tab(s) orally every 6 hours, As Needed -for moderate pain MDD:6 tabs,  1 tab every 6 hrs for mid pain if needed, 2 tabs every 6 hrs for severe pain if needed   tamoxifen 20 mg oral tablet: 1 tab(s) orally once a day  Xanax 0.25 mg oral tablet: 1 tab(s) orally once a day (at bedtime), As Needed -for anxiety MDD:1 tab    Adoxa 100 mg oral tablet: 1 tab(s) orally 2 times a day MDD:2  Cipro 500 mg oral tablet: 1 tab(s) orally 2 times a day MDD:2  oxycodone-acetaminophen 5 mg-325 mg oral tablet: 1 tab(s) orally every 6 hours, As Needed -for moderate pain MDD:6 tabs,  1 tab every 6 hrs for mid pain if needed, 2 tabs every 6 hrs for severe pain if needed   tamoxifen 20 mg oral tablet: 1 tab(s) orally once a day  Xanax 0.25 mg oral tablet: 1 tab(s) orally once a day (at bedtime), As Needed -for anxiety MDD:1 tab

## 2021-11-13 NOTE — PROGRESS NOTE ADULT - PROVIDER SPECIALTY LIST ADULT
Surgery
Infectious Disease
Infectious Disease
Plastic Surgery
Infectious Disease
Infectious Disease
Plastic Surgery
Plastic Surgery
no

## 2021-11-14 LAB
CULTURE RESULTS: SIGNIFICANT CHANGE UP
CULTURE RESULTS: SIGNIFICANT CHANGE UP
SPECIMEN SOURCE: SIGNIFICANT CHANGE UP
SPECIMEN SOURCE: SIGNIFICANT CHANGE UP

## 2021-11-16 ENCOUNTER — NON-APPOINTMENT (OUTPATIENT)
Age: 51
End: 2021-11-16

## 2021-11-17 DIAGNOSIS — Z91.048 OTHER NONMEDICINAL SUBSTANCE ALLERGY STATUS: ICD-10-CM

## 2021-11-17 DIAGNOSIS — B95.61 METHICILLIN SUSCEPTIBLE STAPHYLOCOCCUS AUREUS INFECTION AS THE CAUSE OF DISEASES CLASSIFIED ELSEWHERE: ICD-10-CM

## 2021-11-17 DIAGNOSIS — L76.31 POSTPROCEDURAL HEMATOMA OF SKIN AND SUBCUTANEOUS TISSUE FOLLOWING A DERMATOLOGIC PROCEDURE: ICD-10-CM

## 2021-11-17 DIAGNOSIS — Z88.8 ALLERGY STATUS TO OTHER DRUGS, MEDICAMENTS AND BIOLOGICAL SUBSTANCES STATUS: ICD-10-CM

## 2021-11-17 DIAGNOSIS — Z85.3 PERSONAL HISTORY OF MALIGNANT NEOPLASM OF BREAST: ICD-10-CM

## 2021-11-17 DIAGNOSIS — Z92.3 PERSONAL HISTORY OF IRRADIATION: ICD-10-CM

## 2021-11-17 DIAGNOSIS — L76.21 POSTPROCEDURAL HEMORRHAGE OF SKIN AND SUBCUTANEOUS TISSUE FOLLOWING A DERMATOLOGIC PROCEDURE: ICD-10-CM

## 2021-11-17 DIAGNOSIS — T81.40XA INFECTION FOLLOWING A PROCEDURE, UNSPECIFIED, INITIAL ENCOUNTER: ICD-10-CM

## 2021-11-17 DIAGNOSIS — Z92.21 PERSONAL HISTORY OF ANTINEOPLASTIC CHEMOTHERAPY: ICD-10-CM
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
## 2021-11-17 DIAGNOSIS — B96.89 OTHER SPECIFIED BACTERIAL AGENTS AS THE CAUSE OF DISEASES CLASSIFIED ELSEWHERE: ICD-10-CM

## 2022-02-04 ENCOUNTER — APPOINTMENT (OUTPATIENT)
Dept: FAMILY MEDICINE | Facility: CLINIC | Age: 52
End: 2022-02-04
Payer: COMMERCIAL

## 2022-02-04 VITALS
WEIGHT: 175 LBS | DIASTOLIC BLOOD PRESSURE: 66 MMHG | TEMPERATURE: 97.2 F | HEIGHT: 62 IN | OXYGEN SATURATION: 99 % | SYSTOLIC BLOOD PRESSURE: 102 MMHG | HEART RATE: 96 BPM | BODY MASS INDEX: 32.2 KG/M2

## 2022-02-04 DIAGNOSIS — R09.81 NASAL CONGESTION: ICD-10-CM

## 2022-02-04 DIAGNOSIS — U07.1 COVID-19: ICD-10-CM

## 2022-02-04 DIAGNOSIS — Z01.818 ENCOUNTER FOR OTHER PREPROCEDURAL EXAMINATION: ICD-10-CM

## 2022-02-04 DIAGNOSIS — J98.01 ACUTE BRONCHOSPASM: ICD-10-CM

## 2022-02-04 DIAGNOSIS — Z86.19 PERSONAL HISTORY OF OTHER INFECTIOUS AND PARASITIC DISEASES: ICD-10-CM

## 2022-02-04 DIAGNOSIS — B34.9 VIRAL INFECTION, UNSPECIFIED: ICD-10-CM

## 2022-02-04 DIAGNOSIS — C50.919 MALIGNANT NEOPLASM OF UNSPECIFIED SITE OF UNSPECIFIED FEMALE BREAST: ICD-10-CM

## 2022-02-04 PROCEDURE — 99214 OFFICE O/P EST MOD 30 MIN: CPT | Mod: 25

## 2022-02-04 RX ORDER — LEVOCETIRIZINE DIHYDROCHLORIDE 5 MG/1
5 TABLET ORAL DAILY
Qty: 1 | Refills: 2 | Status: ACTIVE | COMMUNITY
Start: 2022-02-04 | End: 1900-01-01

## 2022-02-04 RX ORDER — AZITHROMYCIN 250 MG/1
250 TABLET, FILM COATED ORAL
Qty: 1 | Refills: 0 | Status: ACTIVE | COMMUNITY
Start: 2022-02-04 | End: 1900-01-01

## 2022-02-04 NOTE — PHYSICAL EXAM
[No Acute Distress] : no acute distress [Well Nourished] : well nourished [Normal Sclera/Conjunctiva] : normal sclera/conjunctiva [EOMI] : extraocular movements intact [Normal Outer Ear/Nose] : the outer ears and nose were normal in appearance [Normal] : normal rate, regular rhythm, normal S1 and S2 and no murmur heard [Coordination Grossly Intact] : coordination grossly intact [Normal Gait] : normal gait [Normal Affect] : the affect was normal [Alert and Oriented x3] : oriented to person, place, and time [Normal Insight/Judgement] : insight and judgment were intact [de-identified] : injected nasal turbinates; maxillary tenderness w/ deep palpation; no mucosa

## 2022-02-04 NOTE — REVIEW OF SYSTEMS
[Nasal Discharge] : no nasal discharge [Negative] : Psychiatric [FreeTextEntry4] : pressure to side of nose

## 2022-02-04 NOTE — HISTORY OF PRESENT ILLNESS
[FreeTextEntry8] : c/o sinus congestion for the past mth since being dx'd w/ covid, has tried nasocort, advil cold & sinus\par had bad reaction to montelukast, does not want prednisone due to hx of taking a lot of it during chemo and also contributed to wgt gain\par not on any allergy meds

## 2022-11-22 NOTE — ED ADULT NURSE NOTE - SUICIDE SCREENING QUESTION 2
Caller: Esdras Peralta    Relationship to patient: Self    Best call back number: [phone]    Patient is needing: PT CALLED AND REQUESTED TO SPEAK TO ROSEANNA ROBERTS. INFORMED PT SHE WAS IN CLINIC WITH PATIENTS AND ASKED IF IT WAS SOMETHING I COULD HELP WITH OR IF I COULD GET SOMEONE ELSE THAT COULD HELP BUT PT DIDN'T PROVIDE ANY DETAILS OTHER THAN WANTING TO SPEAK TO [place].            No

## 2022-12-23 ENCOUNTER — APPOINTMENT (OUTPATIENT)
Dept: ORTHOPEDIC SURGERY | Facility: CLINIC | Age: 52
End: 2022-12-23

## 2022-12-23 VITALS — HEIGHT: 62 IN | WEIGHT: 175 LBS | BODY MASS INDEX: 32.2 KG/M2

## 2022-12-23 DIAGNOSIS — M77.8 OTHER ENTHESOPATHIES, NOT ELSEWHERE CLASSIFIED: ICD-10-CM

## 2022-12-23 DIAGNOSIS — M65.4 RADIAL STYLOID TENOSYNOVITIS [DE QUERVAIN]: ICD-10-CM

## 2022-12-23 PROCEDURE — 73110 X-RAY EXAM OF WRIST: CPT | Mod: RT

## 2022-12-23 PROCEDURE — 99203 OFFICE O/P NEW LOW 30 MIN: CPT

## 2022-12-23 PROCEDURE — L3809: CPT

## 2022-12-23 RX ORDER — IBUPROFEN 600 MG/1
600 TABLET, FILM COATED ORAL 3 TIMES DAILY
Qty: 60 | Refills: 1 | Status: ACTIVE | COMMUNITY
Start: 2022-12-23 | End: 1900-01-01

## 2022-12-23 NOTE — HISTORY OF PRESENT ILLNESS
[8] : 8 [3] : 3 [Dull/Aching] : dull/aching [Localized] : localized [Throbbing] : throbbing [Constant] : constant [Part time] : Work status: part time [de-identified] : 12/23/22: Patient is a 53 yo RHD female c/o right wrist pain for 1 day with no specific injury. No n/t. Tried aleve with little relief. Pain is worse with lifting and gripping. No previous injuries or surgeries to right wrist.  [] : Post Surgical Visit: no [FreeTextEntry1] : Right Wrist [FreeTextEntry5] : Patient complains of wrist pain since 12/22/22, no injury pt felt a crunch in her wrist  [de-identified] : activity

## 2022-12-23 NOTE — ASSESSMENT
[FreeTextEntry1] : Xrays reviewed with patient\par Treatment options discussed \par Ibuprofen 600 sent for pain and inflammation\par Thumb spica brace medically necessary for comfort/stability\par Follow up with hand/wrist in 1-2 weeks

## 2022-12-23 NOTE — PHYSICAL EXAM
[Dorsal Wrist] : dorsal wrist [First Dorsal Compartment] : first dorsal compartment [NL (75)] : Dorsiflexion 75 degrees [NL (85)] : volarflexion 85 degrees [5___] : volarflexion 5[unfilled]/5 [] : light touch intact throughout [Right] : right wrist [There are no fractures, subluxations or dislocations. No significant abnormalities are seen] : There are no fractures, subluxations or dislocations. No significant abnormalities are seen

## 2023-01-03 ENCOUNTER — APPOINTMENT (OUTPATIENT)
Dept: ORTHOPEDIC SURGERY | Facility: CLINIC | Age: 53
End: 2023-01-03

## 2023-03-28 ENCOUNTER — APPOINTMENT (OUTPATIENT)
Dept: FAMILY MEDICINE | Facility: CLINIC | Age: 53
End: 2023-03-28
Payer: COMMERCIAL

## 2023-03-28 DIAGNOSIS — R05.3 CHRONIC COUGH: ICD-10-CM

## 2023-03-28 PROCEDURE — 99213 OFFICE O/P EST LOW 20 MIN: CPT | Mod: 95

## 2023-03-28 RX ORDER — BENZONATATE 200 MG/1
200 CAPSULE ORAL 3 TIMES DAILY
Qty: 15 | Refills: 0 | Status: ACTIVE | COMMUNITY
Start: 2023-03-28 | End: 1900-01-01

## 2023-03-28 RX ORDER — ALBUTEROL SULFATE 90 UG/1
108 (90 BASE) INHALANT RESPIRATORY (INHALATION)
Qty: 1 | Refills: 0 | Status: ACTIVE | COMMUNITY
Start: 2023-03-28 | End: 1900-01-01

## 2023-03-31 PROBLEM — R05.3 PERSISTENT DRY COUGH: Status: ACTIVE | Noted: 2019-02-14

## 2023-03-31 NOTE — HISTORY OF PRESENT ILLNESS
[Home] : at home, [unfilled] , at the time of the visit. [Medical Office: ([facility])___] : at the medical office located in  [Verbal consent obtained from patient] : the patient, [unfilled] [FreeTextEntry8] : GABY SHEARER is a 53 year old female presenting with a cough  for 4 days. Dry persistent coughing, now has headache and backache for coughing. Denies asthma and no tobacco use. Has been using nasacort for the last 2 weeks. \par Denies fever/chills/sob.

## 2024-02-23 RX ORDER — BUDESONIDE AND FORMOTEROL FUMARATE DIHYDRATE 80; 4.5 UG/1; UG/1
80-4.5 AEROSOL RESPIRATORY (INHALATION) TWICE DAILY
Qty: 1 | Refills: 1 | Status: ACTIVE | COMMUNITY
Start: 2023-03-28

## 2024-10-07 ENCOUNTER — APPOINTMENT (OUTPATIENT)
Dept: HEPATOLOGY | Facility: CLINIC | Age: 54
End: 2024-10-07

## 2024-10-16 ENCOUNTER — RX RENEWAL (OUTPATIENT)
Age: 54
End: 2024-10-16

## 2024-11-21 NOTE — ED ADULT NURSE NOTE - NSFALLRSKHARMRISK_ED_ALL_ED
Physical Therapy Visit    Visit Type: Daily Treatment Note  Visit: 11  Referring Provider: Jim Calderon MD  Medical Diagnosis (from order): M54.2 - Cervical spine pain     SUBJECTIVE                                                                                                               Neck feels a little stiff, didn't get much sleep last night.   Patient arrived 20 minutes late due to inclement weather.       OBJECTIVE                                                                                                                                         Treatment     Manual Therapy   - STM to bilateral cervical paraspinals, upper trapezius, levator scapulae and suboccipital release in supine. Trigger point release to B levator scapula.   -Lateral glides applied to upper and lower cervical spine grade IV in supine  -Up glides applied to C2-C7 grades III-IV in supine     Skilled input: verbal instruction/cues, tactile instruction/cues and facilitation    Writer verbally educated and received verbal consent for hand placement, positioning of patient, and techniques to be performed today from patient for hand placement and palpation for techniques, modality application and therapist position for techniques as described above and how they are pertinent to the patient's plan of care.  Home Exercise Program  Access Code: [email or web address]  URL: [email or web address]  Date: 10/23/2024  Prepared by: Doc Villegas    Exercises  - Seated Cervical Retraction  - 1 x daily - 7 x weekly - 3 sets - 10 reps  - Median Nerve Glide  - 1 x daily - 7 x weekly - 3 sets - 10 reps  - Median Nerve Flossing - Tray  - 1 x daily - 7 x weekly - 3 sets - 10 reps  - Seated Upper Trapezius Stretch  - 2 x daily - 7 x weekly - 3 sets - 3 reps - 30 seconds  hold      ASSESSMENT                                                                                                            Only completed manual therapy today as patient  arrived late due to weather. Still reported feeling much looser after treatment which did not include traction today. She will continue to benefit from further skilled therapy as she is noticing much improvement in her overall symptoms, but continues with limitations due to neck stiffness and headaches.   Education:   - Results of above outlined education: Verbalizes understanding and Demonstrates understanding    PLAN                                                                                                                           Suggestions for next session as indicated: Progress per plan of care  Continue cervical traction with 15# pull   Continue manual technique  Update HEP as needed - scapular stabilizations         Therapy procedure time and total treatment time can be found documented on the Time Entry flowsheet     no